# Patient Record
Sex: MALE | Race: WHITE | NOT HISPANIC OR LATINO | URBAN - METROPOLITAN AREA
[De-identification: names, ages, dates, MRNs, and addresses within clinical notes are randomized per-mention and may not be internally consistent; named-entity substitution may affect disease eponyms.]

---

## 2019-08-29 PROBLEM — Z00.00 ENCOUNTER FOR PREVENTIVE HEALTH EXAMINATION: Status: ACTIVE | Noted: 2019-08-29

## 2022-09-09 NOTE — H&P PST ADULT - ASSESSMENT
67 year old male with h/o HTN, HLD, s/p CVA 7/14/22 thought to be secondary to intracardiac thrombus now on coumadin who c/o TINAJERO.  PET showed decrease in EF to 37% and abnormal myocardial perfusion imaging.  For [facility] to assess coronary arteries.      Impression/Plan: 66yo male with evidence of likely recent MI c/b LVEF drop to 40-45% on TTE and LV thrombus with subsequent CVA 7/14/22, started on coumadin and PET/CT with large reversible defect ante/antelateral/septal wall, now presenting for LHC.    -plan for LHC via RA vs FA  -patient seen and examined  -confirmed appropriate NPO duration  -ECG and Labs reviewed  -Aspirin 81mg po pre-cath  -NS 50mL/hr X 4hr  -procedure discussed with patient; risks and benefits explained, questions answered  -consent obtained by attending IC

## 2022-09-09 NOTE — H&P PST ADULT - HISTORY OF PRESENT ILLNESS
Department of Cardiology                                                                  [facility]/[place] [address] [place] [address]                                                            Telephone: [phone]. Fax:[phone]                                                                             Pre- Cardiac Procedure H + P      HPI:  67 year old male with h/o HTN, HLD, s/p CVA 7/14/22 thought to be secondary to intracardiac thrombus now on coumadin who c/o TINAJERO.  PET showed decrease in EF to 37% and abnormal myocardial perfusion imaging.  For [facility] to assess coronary arteries.      Symptoms:        Angina (Class):        Ischemic Symptoms:     Heart Failure:        Systolic/Diastolic/Combined:        NYHA Class (within 2 weeks):     Assessment of LVEF:       EF: 40-45%       Assessed by: echo       Date: 6/10/22    Prior Cardiac Interventions: None         Noninvasive Testing:   Stress Test: Date: 8/23/22       Protocol: Cardiac PET       Symptoms: none       EKG Changes: none       Myocardial Imaging: large sized reversible defect in the anterior, anterolateral and septal areas.  Coronary flow reserved reduced       Risk Assessment (Low, Medium, High): medium    Echo:   mild AS, mild AR, mild to mod MR    Risk Assessments:  ASA:  Mallampati:  Bleeding Risk:  Creatinine:  GFR:    Associated Risk Factors:        Frailty Screening: (N/A, mild, mod, severe)       Cerebrovascular Disease: N/A       Chronic Lung Disease: N/A       Peripheral Arterial Disease: N/A       Chronic Kidney Disease (if yes, what is GFR): N/A       Uncontrolled Diabetes (if yes, what is HgbA1C or FBS): N/A       Poorly Controlled Hypertension (if yes, what is SBP): N/A       Morbid Obesity (if yes, what is BMI): N/A       History of Recent Ventricular Arrhythmia: N/A       Inability to Ambulate Safely: N/A       Need for Therapeutic Anticoagulation: y       Antiplatelet or Contrast Allergy: N/A    Antianginal Therapies:        Beta Blockers:         Calcium Channel Blockers:        Long Acting Nitrates:        Ranexa:     	  MEDICATIONS:                    ROS:     PHYSICAL EXAM:      T(C): --  HR: --  BP: --  RR: --  SpO2: --  Wt(kg): --      I&O's Summary      Daily     Daily     TELEMETRY: 	      ECG:  	    LABS:	 	                        Tnl:    Lipid Profile:   TC  TG  LDL  HDL    HgA1c:     proBNP:     TSH:     Impression/Plan:      ****-IVH with NS 250mL bolus pre-cath                                                                             Department of Cardiology                                                                  [facility]/[place] [address] [facility]                                                            Telephone: [phone]. Fax:[phone]                                                                             Pre- Cardiac Procedure H + P      HPI:  67 year old male with h/o HTN, HLD, recently seen by PCP and noted to have ECG abnormality, subsequently referred to Dr Mirza and underwent TTE 6/10/22 and noted to have drop in LVEF to 40-45%, plan to undergo further w/u but subsequently admitted with CVA 7/14/22 possible sequale of likely recent MI c/b LV thrombus, PET/CT with large reversible defect anteried decrease in EF to 37% and abnormal myocardial perfusion imaging.  For [facility] to assess coronary arteries.      Symptoms:        Angina (Class):        Ischemic Symptoms:     Heart Failure:        Systolic/Diastolic/Combined:        NYHA Class (within 2 weeks):     Assessment of LVEF:       EF: 40-45%       Assessed by: echo       Date: 6/10/22    Prior Cardiac Interventions: None         Noninvasive Testing:   Stress Test: Date: 8/23/22       Protocol: Cardiac PET       Symptoms: none       EKG Changes: none       Myocardial Imaging: large sized reversible defect in the anterior, anterolateral and septal areas.  Coronary flow reserved reduced       Risk Assessment (Low, Medium, High): medium    Echo:   mild AS, mild AR, mild to mod MR    Risk Assessments:  ASA:  Mallampati:  Bleeding Risk:  Creatinine:  GFR:    Associated Risk Factors:        Frailty Screening: (N/A, mild, mod, severe)       Cerebrovascular Disease: N/A       Chronic Lung Disease: N/A       Peripheral Arterial Disease: N/A       Chronic Kidney Disease (if yes, what is GFR): N/A       Uncontrolled Diabetes (if yes, what is HgbA1C or FBS): N/A       Poorly Controlled Hypertension (if yes, what is SBP): N/A       Morbid Obesity (if yes, what is BMI): N/A       History of Recent Ventricular Arrhythmia: N/A       Inability to Ambulate Safely: N/A       Need for Therapeutic Anticoagulation: y       Antiplatelet or Contrast Allergy: N/A    Antianginal Therapies:        Beta Blockers:         Calcium Channel Blockers:        Long Acting Nitrates:        Ranexa:     	  MEDICATIONS:                    ROS:     PHYSICAL EXAM:      T(C): --  HR: --  BP: --  RR: --  SpO2: --  Wt(kg): --      I&O's Summary      Daily     Daily     TELEMETRY: 	      ECG:  	    LABS:	 	                        Tnl:    Lipid Profile:   TC  TG  LDL  HDL    HgA1c:     proBNP:     TSH:     Impression/Plan:      ****-IVH with NS 250mL bolus pre-cath                                                                             Department of Cardiology                                                                  [facility]/[place] [address]                                                            Telephone: [phone]. Fax:[phone]                                                                             Pre- Cardiac Procedure H + P      HPI:  67 year old male with h/o HTN, HLD, recently seen by PCP and noted to have ECG with ante QW, subsequently referred to cardiologist Dr Mirza and underwent TTE 6/10/22 and noted to have drop in LVEF to 40-45%, plan to undergo further w/u but subsequently admitted with CVA 7/14/22, possible sequale of likely recent MI c/b LV thrombus, started on coumadin, residual left sided weakness but improved, mild speech impairment and left eye vision loss, does report intermittent episodes for chest pain during stressful events, PET/CT with large reversible defect anterior/antelat/septal wall, LVEF rest 37%, stress 36%, now presents for LHC to be performed by Dr Lisa.       Symptoms:        Angina (Class): III       Ischemic Symptoms: CP    Heart Failure:        Systolic/Diastolic/Combined: LVEF 40-45%       NYHA Class (within 2 weeks): II    Assessment of LVEF:       EF: 40-45%       Assessed by: echo       Date: 6/10/22    Prior Cardiac Interventions: None         PET/CT 8/23/22:       Protocol: Cardiac PET       Symptoms: none       EKG Changes: none       Myocardial Imaging: large sized reversible defect in the anterior, anterolateral and septal areas.  Coronary flow reserved reduced       Risk Assessment (Low, Medium, High): medium    Echo 6/10/22:   mild AS, mild AR, mild to mod MR, EF 40-45%    Risk Assessments:  ASA: 3  Mallampati: 2  Bleeding Risk: 1.3%  Creatinine: 0.8  GFR: 94    Associated Risk Factors:        Frailty Screening: (N/A, mild, mod, severe): mod       Cerebrovascular Disease: N/A       Chronic Lung Disease: N/A       Peripheral Arterial Disease: N/A       Chronic Kidney Disease (if yes, what is GFR): N/A       Uncontrolled Diabetes (if yes, what is HgbA1C or FBS): N/A       Poorly Controlled Hypertension (if yes, what is SBP): N/A       Morbid Obesity (if yes, what is BMI): N/A       History of Recent Ventricular Arrhythmia: N/A       Inability to Ambulate Safely: N/A       Need for Therapeutic Anticoagulation: y       Antiplatelet or Contrast Allergy: N/A    Antianginal Therapies:        Beta Blockers:  Toprol       Calcium Channel Blockers:        Long Acting Nitrates:        Ranexa:     ROS: as stated above, otherwise negative    PHYSICAL EXAM:  Constitutional: A & O x 3, NAD  HEENT:  Normal oral mucosa, PERRL, EOMI	  Cardiovascular: S1 S2, II/VI systolic murmur, No JVD  Respiratory: Lungs clear to auscultation	  Gastrointestinal:  Soft, Non-tender, + BS	  Skin: No rashes or cyanosis  Neurologic: No deficit appreciated  Extremities: Normal range of motion, no edema  Vascular: distal pulses +       ECG:  SR 84BPM with ante QW    LABS:	 	    09-12    139  |  103  |  20.1<H>  ----------------------------<  106<H>  3.9   |  26.0  |  0.85    Ca    10.0      12 Sep 2022 08:40  Mg     2.1     09-12                          15.8   7.03  )-----------( 216      ( 12 Sep 2022 08:40 )             47.2

## 2022-09-09 NOTE — H&P PST ADULT - NSICDXPASTMEDICALHX_GEN_ALL_CORE_FT
PAST MEDICAL HISTORY:  Abnormal nuclear stress test     Cardiomyopathy     CVA (cerebrovascular accident)     Hyperlipidemia     Hypertension     Intracardiac thrombus

## 2022-09-12 ENCOUNTER — INPATIENT (INPATIENT)
Facility: HOSPITAL | Age: 67
LOS: 6 days | Discharge: HOME HEALTH | DRG: 217 | End: 2022-09-19
Attending: THORACIC SURGERY (CARDIOTHORACIC VASCULAR SURGERY) | Admitting: THORACIC SURGERY (CARDIOTHORACIC VASCULAR SURGERY)
Payer: MEDICARE

## 2022-09-12 VITALS
RESPIRATION RATE: 16 BRPM | OXYGEN SATURATION: 99 % | HEART RATE: 83 BPM | DIASTOLIC BLOOD PRESSURE: 57 MMHG | SYSTOLIC BLOOD PRESSURE: 122 MMHG | TEMPERATURE: 98 F

## 2022-09-12 DIAGNOSIS — E78.5 HYPERLIPIDEMIA, UNSPECIFIED: ICD-10-CM

## 2022-09-12 DIAGNOSIS — I50.20 UNSPECIFIED SYSTOLIC (CONGESTIVE) HEART FAILURE: ICD-10-CM

## 2022-09-12 DIAGNOSIS — R94.39 ABNORMAL RESULT OF OTHER CARDIOVASCULAR FUNCTION STUDY: ICD-10-CM

## 2022-09-12 DIAGNOSIS — I51.3 INTRACARDIAC THROMBOSIS, NOT ELSEWHERE CLASSIFIED: ICD-10-CM

## 2022-09-12 DIAGNOSIS — I63.9 CEREBRAL INFARCTION, UNSPECIFIED: ICD-10-CM

## 2022-09-12 DIAGNOSIS — I25.10 ATHEROSCLEROTIC HEART DISEASE OF NATIVE CORONARY ARTERY WITHOUT ANGINA PECTORIS: ICD-10-CM

## 2022-09-12 DIAGNOSIS — I10 ESSENTIAL (PRIMARY) HYPERTENSION: ICD-10-CM

## 2022-09-12 LAB
ANION GAP SERPL CALC-SCNC: 10 MMOL/L — SIGNIFICANT CHANGE UP (ref 5–17)
APPEARANCE UR: CLEAR — SIGNIFICANT CHANGE UP
APTT BLD: 31.3 SEC — SIGNIFICANT CHANGE UP (ref 27.5–35.5)
APTT BLD: 35.8 SEC — HIGH (ref 27.5–35.5)
BILIRUB UR-MCNC: NEGATIVE — SIGNIFICANT CHANGE UP
BUN SERPL-MCNC: 20.1 MG/DL — HIGH (ref 8–20)
CALCIUM SERPL-MCNC: 10 MG/DL — SIGNIFICANT CHANGE UP (ref 8.4–10.5)
CHLORIDE SERPL-SCNC: 103 MMOL/L — SIGNIFICANT CHANGE UP (ref 98–107)
CO2 SERPL-SCNC: 26 MMOL/L — SIGNIFICANT CHANGE UP (ref 22–29)
COLOR SPEC: YELLOW — SIGNIFICANT CHANGE UP
CREAT SERPL-MCNC: 0.85 MG/DL — SIGNIFICANT CHANGE UP (ref 0.5–1.3)
DIFF PNL FLD: NEGATIVE — SIGNIFICANT CHANGE UP
EGFR: 94 ML/MIN/1.73M2 — SIGNIFICANT CHANGE UP
GLUCOSE SERPL-MCNC: 106 MG/DL — HIGH (ref 70–99)
GLUCOSE UR QL: NEGATIVE MG/DL — SIGNIFICANT CHANGE UP
HCT VFR BLD CALC: 47.2 % — SIGNIFICANT CHANGE UP (ref 39–50)
HGB BLD-MCNC: 15.8 G/DL — SIGNIFICANT CHANGE UP (ref 13–17)
INR BLD: 1.33 RATIO — HIGH (ref 0.88–1.16)
KETONES UR-MCNC: ABNORMAL
LEUKOCYTE ESTERASE UR-ACNC: NEGATIVE — SIGNIFICANT CHANGE UP
MAGNESIUM SERPL-MCNC: 2.1 MG/DL — SIGNIFICANT CHANGE UP (ref 1.6–2.6)
MCHC RBC-ENTMCNC: 28.6 PG — SIGNIFICANT CHANGE UP (ref 27–34)
MCHC RBC-ENTMCNC: 33.5 GM/DL — SIGNIFICANT CHANGE UP (ref 32–36)
MCV RBC AUTO: 85.4 FL — SIGNIFICANT CHANGE UP (ref 80–100)
NITRITE UR-MCNC: NEGATIVE — SIGNIFICANT CHANGE UP
PH UR: 6 — SIGNIFICANT CHANGE UP (ref 5–8)
PLATELET # BLD AUTO: 216 K/UL — SIGNIFICANT CHANGE UP (ref 150–400)
POTASSIUM SERPL-MCNC: 3.9 MMOL/L — SIGNIFICANT CHANGE UP (ref 3.5–5.3)
POTASSIUM SERPL-SCNC: 3.9 MMOL/L — SIGNIFICANT CHANGE UP (ref 3.5–5.3)
PROT UR-MCNC: NEGATIVE — SIGNIFICANT CHANGE UP
PROTHROM AB SERPL-ACNC: 15.5 SEC — HIGH (ref 10.5–13.4)
RBC # BLD: 5.53 M/UL — SIGNIFICANT CHANGE UP (ref 4.2–5.8)
RBC # FLD: 14.1 % — SIGNIFICANT CHANGE UP (ref 10.3–14.5)
SODIUM SERPL-SCNC: 139 MMOL/L — SIGNIFICANT CHANGE UP (ref 135–145)
SP GR SPEC: 1.02 — SIGNIFICANT CHANGE UP (ref 1.01–1.02)
UROBILINOGEN FLD QL: NEGATIVE MG/DL — SIGNIFICANT CHANGE UP
WBC # BLD: 7.03 K/UL — SIGNIFICANT CHANGE UP (ref 3.8–10.5)
WBC # FLD AUTO: 7.03 K/UL — SIGNIFICANT CHANGE UP (ref 3.8–10.5)

## 2022-09-12 PROCEDURE — 93880 EXTRACRANIAL BILAT STUDY: CPT | Mod: 26

## 2022-09-12 PROCEDURE — 99152 MOD SED SAME PHYS/QHP 5/>YRS: CPT

## 2022-09-12 PROCEDURE — 70450 CT HEAD/BRAIN W/O DYE: CPT | Mod: 26

## 2022-09-12 PROCEDURE — 99221 1ST HOSP IP/OBS SF/LOW 40: CPT

## 2022-09-12 PROCEDURE — 99223 1ST HOSP IP/OBS HIGH 75: CPT

## 2022-09-12 PROCEDURE — 93454 CORONARY ARTERY ANGIO S&I: CPT | Mod: 26

## 2022-09-12 PROCEDURE — 93306 TTE W/DOPPLER COMPLETE: CPT | Mod: 26

## 2022-09-12 RX ORDER — METOPROLOL TARTRATE 50 MG
25 TABLET ORAL DAILY
Refills: 0 | Status: DISCONTINUED | OUTPATIENT
Start: 2022-09-12 | End: 2022-09-14

## 2022-09-12 RX ORDER — ROSUVASTATIN CALCIUM 5 MG/1
1 TABLET ORAL
Qty: 0 | Refills: 0 | DISCHARGE

## 2022-09-12 RX ORDER — SODIUM CHLORIDE 9 MG/ML
3 INJECTION INTRAMUSCULAR; INTRAVENOUS; SUBCUTANEOUS EVERY 8 HOURS
Refills: 0 | Status: DISCONTINUED | OUTPATIENT
Start: 2022-09-12 | End: 2022-09-14

## 2022-09-12 RX ORDER — HEPARIN SODIUM 5000 [USP'U]/ML
INJECTION INTRAVENOUS; SUBCUTANEOUS
Qty: 25000 | Refills: 0 | Status: DISCONTINUED | OUTPATIENT
Start: 2022-09-12 | End: 2022-09-12

## 2022-09-12 RX ORDER — ATORVASTATIN CALCIUM 80 MG/1
80 TABLET, FILM COATED ORAL AT BEDTIME
Refills: 0 | Status: DISCONTINUED | OUTPATIENT
Start: 2022-09-12 | End: 2022-09-14

## 2022-09-12 RX ORDER — SPIRONOLACTONE 25 MG/1
12.5 TABLET, FILM COATED ORAL DAILY
Refills: 0 | Status: DISCONTINUED | OUTPATIENT
Start: 2022-09-13 | End: 2022-09-14

## 2022-09-12 RX ORDER — ASPIRIN/CALCIUM CARB/MAGNESIUM 324 MG
1 TABLET ORAL
Qty: 0 | Refills: 0 | DISCHARGE

## 2022-09-12 RX ORDER — HEPARIN SODIUM 5000 [USP'U]/ML
3500 INJECTION INTRAVENOUS; SUBCUTANEOUS EVERY 6 HOURS
Refills: 0 | Status: DISCONTINUED | OUTPATIENT
Start: 2022-09-12 | End: 2022-09-14

## 2022-09-12 RX ORDER — SODIUM CHLORIDE 9 MG/ML
200 INJECTION INTRAMUSCULAR; INTRAVENOUS; SUBCUTANEOUS
Refills: 0 | Status: DISCONTINUED | OUTPATIENT
Start: 2022-09-12 | End: 2022-09-12

## 2022-09-12 RX ORDER — POTASSIUM CHLORIDE 20 MEQ
20 PACKET (EA) ORAL ONCE
Refills: 0 | Status: DISCONTINUED | OUTPATIENT
Start: 2022-09-12 | End: 2022-09-12

## 2022-09-12 RX ORDER — HEPARIN SODIUM 5000 [USP'U]/ML
7500 INJECTION INTRAVENOUS; SUBCUTANEOUS EVERY 6 HOURS
Refills: 0 | Status: DISCONTINUED | OUTPATIENT
Start: 2022-09-12 | End: 2022-09-14

## 2022-09-12 RX ORDER — SODIUM CHLORIDE 9 MG/ML
200 INJECTION INTRAMUSCULAR; INTRAVENOUS; SUBCUTANEOUS
Refills: 0 | Status: COMPLETED | OUTPATIENT
Start: 2022-09-12 | End: 2022-09-12

## 2022-09-12 RX ORDER — POTASSIUM CHLORIDE 20 MEQ
20 PACKET (EA) ORAL ONCE
Refills: 0 | Status: COMPLETED | OUTPATIENT
Start: 2022-09-12 | End: 2022-09-12

## 2022-09-12 RX ORDER — SACUBITRIL AND VALSARTAN 24; 26 MG/1; MG/1
1 TABLET, FILM COATED ORAL
Refills: 0 | Status: DISCONTINUED | OUTPATIENT
Start: 2022-09-12 | End: 2022-09-13

## 2022-09-12 RX ORDER — HEPARIN SODIUM 5000 [USP'U]/ML
INJECTION INTRAVENOUS; SUBCUTANEOUS
Qty: 25000 | Refills: 0 | Status: DISCONTINUED | OUTPATIENT
Start: 2022-09-12 | End: 2022-09-14

## 2022-09-12 RX ORDER — ASPIRIN/CALCIUM CARB/MAGNESIUM 324 MG
81 TABLET ORAL DAILY
Refills: 0 | Status: DISCONTINUED | OUTPATIENT
Start: 2022-09-12 | End: 2022-09-14

## 2022-09-12 RX ADMIN — ATORVASTATIN CALCIUM 80 MILLIGRAM(S): 80 TABLET, FILM COATED ORAL at 21:34

## 2022-09-12 RX ADMIN — Medication 20 MILLIEQUIVALENT(S): at 11:05

## 2022-09-12 RX ADMIN — SACUBITRIL AND VALSARTAN 1 TABLET(S): 24; 26 TABLET, FILM COATED ORAL at 17:44

## 2022-09-12 RX ADMIN — SODIUM CHLORIDE 3 MILLILITER(S): 9 INJECTION INTRAMUSCULAR; INTRAVENOUS; SUBCUTANEOUS at 21:04

## 2022-09-12 RX ADMIN — SODIUM CHLORIDE 50 MILLILITER(S): 9 INJECTION INTRAMUSCULAR; INTRAVENOUS; SUBCUTANEOUS at 11:04

## 2022-09-12 RX ADMIN — SODIUM CHLORIDE 3 MILLILITER(S): 9 INJECTION INTRAMUSCULAR; INTRAVENOUS; SUBCUTANEOUS at 14:39

## 2022-09-12 RX ADMIN — HEPARIN SODIUM 1700 UNIT(S)/HR: 5000 INJECTION INTRAVENOUS; SUBCUTANEOUS at 21:35

## 2022-09-12 NOTE — CONSULT NOTE ADULT - PROBLEM SELECTOR RECOMMENDATION 3
LV thrombus found in July s/p CVA, thought to be due to MI  on coumadin at home, held for cath and was bridged with lovenox  heparin gtt started by cardiology, cont with FULL AC nomogram

## 2022-09-12 NOTE — CONSULT NOTE ADULT - PROBLEM SELECTOR RECOMMENDATION 6
EF 40-45 on prior outpt echo per cardiology  repeat TTE pending  cont Entresto for now, will need to hold when plan/timing for cabg determined

## 2022-09-12 NOTE — CONSULT NOTE ADULT - SUBJECTIVE AND OBJECTIVE BOX
Surgeon: Wilder    Consult requesting by: Maria L    HISTORY OF PRESENT ILLNESS:  67M, pmhx HTN, HLD, recent MI, CVA 7/14/22 ( ______ weakness)  found to have LV thrombus, now s/p elective cath with multivessel dz.     HPI:  67 year old male with h/o HTN, HLD, recently seen by PCP and noted to have ECG with ante QW, subsequently referred to cardiologist Dr Mirza and underwent TTE 6/10/22 and noted to have drop in LVEF to 40-45%, plan to undergo further w/u but subsequently admitted with CVA 7/14/22, possible sequale of likely recent MI c/b LV thrombus, started on coumadin, residual left sided weakness but improved, mild speech impairment and left eye vision loss, does report intermittent episodes for chest pain during stressful events, PET/CT with large reversible defect anterior/antelat/septal wall, LVEF rest 37%, stress 36%, now presents for LHC to be performed by Dr Lisa.     PAST MEDICAL & SURGICAL HISTORY:  Hypertension  Hyperlipidemia  CVA (cerebrovascular accident)  Intracardiac thrombus  Abnormal nuclear stress test  Cardiomyopathy  No significant past surgical history    MEDICATIONS  (STANDING):  aspirin  chewable 81 milliGRAM(s) Oral daily  atorvastatin 80 milliGRAM(s) Oral at bedtime  heparin  Infusion.  Unit(s)/Hr (17 mL/Hr) IV Continuous <Continuous>  metoprolol succinate ER 25 milliGRAM(s) Oral daily  sacubitril 24 mG/valsartan 26 mG 1 Tablet(s) Oral two times a day  sodium chloride 0.9% lock flush 3 milliLiter(s) IV Push every 8 hours    MEDICATIONS  (PRN):  heparin   Injectable 7500 Unit(s) IV Push every 6 hours PRN For aPTT less than 40  heparin   Injectable 3500 Unit(s) IV Push every 6 hours PRN For aPTT between 40 - 57    Allergies: No Known Allergies    SOCIAL HISTORY:      FAMILY HISTORY:      Review of Systems  negative x 10 systems except as noted above    PHYSICAL EXAM  Vital Signs Last 24 Hrs  T(C): 36.7 (12 Sep 2022 09:25), Max: 36.7 (12 Sep 2022 09:25)  T(F): 98 (12 Sep 2022 09:25), Max: 98 (12 Sep 2022 09:25)  HR: 70 (12 Sep 2022 12:20) (68 - 83)  BP: 112/71 (12 Sep 2022 12:20) (111/69 - 122/57)  RR: 16 (12 Sep 2022 12:20) (16 - 16)  SpO2: 98% (12 Sep 2022 12:20) (97% - 99%)    Parameters below as of 12 Sep 2022 12:20  Patient On (Oxygen Delivery Method): room air    Gen:  Neuro:  HEENT:  Neck:  CV:  Pulm:  Abd:  Ext:  vascular:  skin:  psych:    LABS:                        15.8   7.03  )-----------( 216      ( 12 Sep 2022 08:40 )             47.2     09-12    139  |  103  |  20.1<H>  ----------------------------<  106<H>  3.9   |  26.0  |  0.85    Ca    10.0      12 Sep 2022 08:40  Mg     2.1     09-12    PT/INR - ( 12 Sep 2022 09:30 )   PT: 15.5 sec;   INR: 1.33 ratio      PTT - ( 12 Sep 2022 09:30 )  PTT:35.8 sec    Cardiac Cath:  official report pending  verbal from Dr. Lisa  dLM 80, prox-mid LAD 90-95, Cx 80-90, RCA 50    TTE / SIMA: pending                            Surgeon: Wilder    Consult requesting by: Maria L Riley cards: Haileycharlessita     HISTORY OF PRESENT ILLNESS:  67M, pmhx HTN, HLD, recent MI, CVA 7/14/22 ( left hand weakness, left eye blindness)  found to have LV thrombus, now s/p elective cath with multivessel dz. Pt originally found to have Q waves on EKG on routine PMD visit referred to cardiology where he had a TTE that showed EF 40-45%. Pt was recommended further eval but had a stroke on 7/14/2022 where he spent 20 days in the hospital per the wife.  During that hospitalization, pt was found to have LV thrombus,  thought to be secondary to a MI and the cause of his CVA. Ultimately discharged home on coumadin. Pt has since undergone a PET CT with large reversible defect in anterior/anterolateral/septal wall with EF 37% and was referred for cardiac cath. Cath with LM, LAD, Cx, and RCA dz.  Cardiology notes pt reporting with intermittent CP. Pt denies, wife at bedside and states she did notice pt was more fatigued leading up to his stroke. Pt denies current CP, palpitations, SOB, cough, fever, chills, itchiness/rash, diaphoresis, vision changes, HA, dizziness/lightheadedness, numbness/tingling, abd pain, N/V.     PAST MEDICAL & SURGICAL HISTORY:  Hypertension  Hyperlipidemia  CVA (cerebrovascular accident)  Intracardiac thrombus  Abnormal nuclear stress test  Cardiomyopathy  No significant past surgical history    MEDICATIONS  (STANDING):  aspirin  chewable 81 milliGRAM(s) Oral daily  atorvastatin 80 milliGRAM(s) Oral at bedtime  heparin  Infusion.  Unit(s)/Hr (17 mL/Hr) IV Continuous <Continuous>  metoprolol succinate ER 25 milliGRAM(s) Oral daily  sacubitril 24 mG/valsartan 26 mG 1 Tablet(s) Oral two times a day  sodium chloride 0.9% lock flush 3 milliLiter(s) IV Push every 8 hours    MEDICATIONS  (PRN):  heparin   Injectable 7500 Unit(s) IV Push every 6 hours PRN For aPTT less than 40  heparin   Injectable 3500 Unit(s) IV Push every 6 hours PRN For aPTT between 40 - 57    Allergies: No Known Allergies    SOCIAL HISTORY:      FAMILY HISTORY:      Review of Systems  negative x 10 systems except as noted above    PHYSICAL EXAM  Vital Signs Last 24 Hrs  T(C): 36.7 (12 Sep 2022 09:25), Max: 36.7 (12 Sep 2022 09:25)  T(F): 98 (12 Sep 2022 09:25), Max: 98 (12 Sep 2022 09:25)  HR: 70 (12 Sep 2022 12:20) (68 - 83)  BP: 112/71 (12 Sep 2022 12:20) (111/69 - 122/57)  RR: 16 (12 Sep 2022 12:20) (16 - 16)  SpO2: 98% (12 Sep 2022 12:20) (97% - 99%)    Parameters below as of 12 Sep 2022 12:20  Patient On (Oxygen Delivery Method): room air    Gen:  Neuro:  HEENT:  Neck:  CV:  Pulm:  Abd:  Ext:  vascular:  skin:  psych:    LABS:                        15.8   7.03  )-----------( 216      ( 12 Sep 2022 08:40 )             47.2     09-12    139  |  103  |  20.1<H>  ----------------------------<  106<H>  3.9   |  26.0  |  0.85    Ca    10.0      12 Sep 2022 08:40  Mg     2.1     09-12    PT/INR - ( 12 Sep 2022 09:30 )   PT: 15.5 sec;   INR: 1.33 ratio      PTT - ( 12 Sep 2022 09:30 )  PTT:35.8 sec    Cardiac Cath:  official report pending  verbal from Dr. Lisa  dLM 80, prox-mid LAD 90-95, Cx 80-90, RCA 50    TTE / SIMA: pending                            Surgeon: Wilder    Consult requesting by: Maria L Riley cards: Haileycharlessita     HISTORY OF PRESENT ILLNESS:  67M, pmhx HTN, HLD, recent MI, CVA 7/14/22 ( left hand weakness, left eye blindness)  found to have LV thrombus, now s/p elective cath with multivessel dz. Pt originally found to have Q waves on EKG on routine PMD visit referred to cardiology where he had a TTE that showed EF 40-45%. Pt was recommended further eval but had a stroke on 7/14/2022 where he spent 20 days in the hospital per the wife.  During that hospitalization, pt was found to have LV thrombus,  thought to be secondary to a MI and the cause of his CVA. Ultimately discharged home on coumadin. Pt has since undergone a PET CT with large reversible defect in anterior/anterolateral/septal wall with EF 37% and was referred for cardiac cath. Cath with LM, LAD, Cx, and RCA dz.  Cardiology notes pt reporting with intermittent CP. Pt denies, wife at bedside and states she did notice pt was more fatigued leading up to his stroke. Pt denies current CP, palpitations, SOB, cough, fever, chills, itchiness/rash, diaphoresis, vision changes, HA, dizziness/lightheadedness, numbness/tingling, abd pain, N/V.     PAST MEDICAL & SURGICAL HISTORY:  Hypertension  Hyperlipidemia  CVA (cerebrovascular accident)  Intracardiac thrombus  Abnormal nuclear stress test  Cardiomyopathy  No significant past surgical history    MEDICATIONS  (STANDING):  aspirin  chewable 81 milliGRAM(s) Oral daily  atorvastatin 80 milliGRAM(s) Oral at bedtime  heparin  Infusion.  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[employer]  currently not working but independent, no use of assist devices     FAMILY HISTORY:  "heart problems" in mother/father    Review of Systems  negative x 10 systems except as noted above    PHYSICAL EXAM  Vital Signs Last 24 Hrs  T(C): 36.7 (12 Sep 2022 09:25), Max: 36.7 (12 Sep 2022 09:25)  T(F): 98 (12 Sep 2022 09:25), Max: 98 (12 Sep 2022 09:25)  HR: 70 (12 Sep 2022 12:20) (68 - 83)  BP: 112/71 (12 Sep 2022 12:20) (111/69 - 122/57)  RR: 16 (12 Sep 2022 12:20) (16 - 16)  SpO2: 98% (12 Sep 2022 12:20) (97% - 99%)    Parameters below as of 12 Sep 2022 12:20  Patient On (Oxygen Delivery Method): room air    Gen: NAD  Neuro: A&Ox3 L hand weakness, b/l LE equal strength  HEENT:PERRL  Neck: supple, no JVD/carotid bruits  CV: S1s2 RRR no murmurs  Pulm: CTA b/l no wheezing  Abd: +BS soft NT ND  Ext: no edema/cyanosis GARCIA  vascular: 2+ DP and radial pulses b/l  skin: no rashes, warm dry perfused  psych: flat affect    LABS:                        15.8   7.03  )-----------( 216      ( 12 Sep 2022 08:40 )             47.2     09-12    139  |  103  |  20.1<H>  ----------------------------<  106<H>  3.9   |  26.0  |  0.85    Ca    10.0      12 Sep 2022 08:40  Mg     2.1     09-12    PT/INR - ( 12 Sep 2022 09:30 )   PT: 15.5 sec;   INR: 1.33 ratio      PTT - ( 12 Sep 2022 09:30 )  PTT:35.8 sec    Cardiac Cath:  official report pending  verbal from Dr. Lisa  dLM 80, prox-mid LAD 90-95, Cx 80-90, RCA 50    TTE / SIMA: pending                            Surgeon: Wilder    Consult requesting by: Maria L Riley cards: Haileycharlessita     HISTORY OF PRESENT ILLNESS:  67M, pmhx HTN, HLD, recent MI, CVA 7/14/22 ( left hand weakness, left eye blindness)  found to have LV thrombus, now s/p elective cath with multivessel dz. Pt originally found to have Q waves on EKG on routine PMD visit referred to cardiology where he had a TTE that showed EF 40-45%. Pt was recommended further eval but had a stroke on 7/14/2022 where he spent 20 days in the hospital per the wife.  During that hospitalization, pt was found to have LV thrombus,  thought to be secondary to a MI and the cause of his CVA. Ultimately discharged home on coumadin. Pt has since undergone a PET CT with large reversible defect in anterior/anterolateral/septal wall with EF 37% and was referred for cardiac cath. Cath with LM, LAD, Cx, and RCA dz.  Cardiology notes pt reporting with intermittent CP. Pt denies, wife at bedside and states she did notice pt was more fatigued leading up to his stroke. Pt denies current CP, palpitations, SOB, cough, fever, chills, itchiness/rash, diaphoresis, vision changes, HA, dizziness/lightheadedness, numbness/tingling, abd pain, N/V.     PAST MEDICAL & SURGICAL HISTORY:  Hypertension  Hyperlipidemia  CVA (cerebrovascular accident)  Intracardiac thrombus  Abnormal nuclear stress test  Cardiomyopathy  No significant past surgical history    MEDICATIONS  (STANDING):  aspirin  chewable 81 milliGRAM(s) Oral daily  atorvastatin 80 milliGRAM(s) Oral at bedtime  heparin  Infusion.  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 but independent, no use of assist devices     FAMILY HISTORY:  "heart problems" in mother/father  MTHFR gene    Review of Systems  negative x 10 systems except as noted above    PHYSICAL EXAM  Vital Signs Last 24 Hrs  T(C): 36.7 (12 Sep 2022 09:25), Max: 36.7 (12 Sep 2022 09:25)  T(F): 98 (12 Sep 2022 09:25), Max: 98 (12 Sep 2022 09:25)  HR: 70 (12 Sep 2022 12:20) (68 - 83)  BP: 112/71 (12 Sep 2022 12:20) (111/69 - 122/57)  RR: 16 (12 Sep 2022 12:20) (16 - 16)  SpO2: 98% (12 Sep 2022 12:20) (97% - 99%)    Parameters below as of 12 Sep 2022 12:20  Patient On (Oxygen Delivery Method): room air    Gen: NAD  Neuro: A&Ox3 L hand weakness, b/l LE equal strength  HEENT:PERRL  Neck: supple, no JVD/carotid bruits  CV: S1s2 RRR no murmurs  Pulm: CTA b/l no wheezing  Abd: +BS soft NT ND  Ext: no edema/cyanosis GARCIA  vascular: 2+ DP and radial pulses b/l  skin: no rashes, warm dry perfused  psych: flat affect    LABS:                        15.8   7.03  )-----------( 216      ( 12 Sep 2022 08:40 )             47.2     09-12    139  |  103  |  20.1<H>  ----------------------------<  106<H>  3.9   |  26.0  |  0.85    Ca    10.0      12 Sep 2022 08:40  Mg     2.1     09-12    PT/INR - ( 12 Sep 2022 09:30 )   PT: 15.5 sec;   INR: 1.33 ratio      PTT - ( 12 Sep 2022 09:30 )  PTT:35.8 sec    Cardiac Cath:  official report pending  verbal from Dr. Lisa  dLM 80, prox-mid LAD 90-95, Cx 80-90, RCA 50    TTE / SIMA: pending

## 2022-09-12 NOTE — CONSULT NOTE ADULT - SUBJECTIVE AND OBJECTIVE BOX
[facility]                                        Neurology at [place]                                  Mary Corado, & Cuco                                      [address]                                           [place], [place], [address]                                                [phone]        CC: Stroke    HISTORY:  The patient is a 67y Male with history of stroke in July of 2022 with mild left residual.   Work up revealed intracardiac thrombus and the patient has been on anticoagulation in addition to antiplatelet and statin.  Now presenting with cardiac issues.   Neurology called for clearance for CABG.     PAST MEDICAL & SURGICAL HISTORY:  Hypertension  Hyperlipidemia  CVA (cerebrovascular accident)  Intracardiac thrombus  Abnormal nuclear stress test  Cardiomyopathy  No significant past surgical history    MEDICATION PRIOR TO ADMISSION:  aspirin 81 mg oral tablet, chewable: 1 tab(s) orally once a day (12 Sep 2022 09:27)  Entresto 24 mg-26 mg oral tablet: 1 tab(s) orally 2 times a day (12 Sep 2022 09:27)  Lovenox 100 mg/mL injectable solution: injectable 2 times a day (12 Sep 2022 09:28)  metoprolol succinate 25 mg oral tablet, extended release: 1 tab(s) orally once a day (12 Sep 2022 09:27)  rosuvastatin 40 mg oral tablet: 1 tab(s) orally once a day (at bedtime) (12 Sep 2022 09:28)  spironolactone: 12.5 milligram(s) orally once a day (12 Sep 2022 09:28)  warfarin 7.5 mg oral tablet: 1 tab(s) orally once a day (at bedtime) (12 Sep 2022 09:28)    MEDICATIONS  (STANDING):  aspirin  chewable 81 milliGRAM(s) Oral daily  atorvastatin 80 milliGRAM(s) Oral at bedtime  heparin  Infusion.  Unit(s)/Hr (17 mL/Hr) IV Continuous <Continuous>  metoprolol succinate ER 25 milliGRAM(s) Oral daily  sacubitril 24 mG/valsartan 26 mG 1 Tablet(s) Oral two times a day  sodium chloride 0.9% lock flush 3 milliLiter(s) IV Push every 8 hours    MEDICATIONS  (PRN):  heparin   Injectable 7500 Unit(s) IV Push every 6 hours PRN For aPTT less than 40  heparin   Injectable 3500 Unit(s) IV Push every 6 hours PRN For aPTT between 40 - 57    Allergies  No Known Allergies    SOCIAL HISTORY:  Non smoker.     FAMILY HISTORY:  No known family history of stroke.     ROS:  Constitutional: The patient denies fevers or weight changes.  Neuro: As per HPI.  Eyes: Denies blurry vision.  Ears/nose/throat: Denies Tinnitus.   Cardiac: Denies chest pain. Denies palpitations.  Respiratory: Denies shortness of breath.  GI: Denies abdominal pain, nausea, or vomiting.  : Denies change in urinary pattern.  Integumentary: Denies rash.  Psych: Denies recent mood changes.  Heme: denies easy bleeding/bruising.    Exam:  Vital Signs Last 24 Hrs  T(C): 36.7 (12 Sep 2022 09:25), Max: 36.7 (12 Sep 2022 09:25)  T(F): 98 (12 Sep 2022 09:25), Max: 98 (12 Sep 2022 09:25)  HR: 73 (12 Sep 2022 13:50) (68 - 83)  BP: 121/81 (12 Sep 2022 13:50) (107/68 - 122/57)  BP(mean): --  RR: 16 (12 Sep 2022 13:50) (16 - 16)  SpO2: 99% (12 Sep 2022 13:50) (97% - 99%)    Parameters below as of 12 Sep 2022 13:50  Patient On (Oxygen Delivery Method): room air    General: NAD.   Carotid bruits absent.     Mental status: The patient is awake, alert, and fully oriented. There is no aphasia. Attention span is normal. Patient is aware of current events.     Cranial nerves: There is no papilledema. Pupils react symmetrically to light. There is mild left visual field deficit to confrontation. Extraocular motion is full with no nystagmus.  Facial sensation is intact. Facial musculature is symmetric. Palate elevates symmetrically. Tongue is midline.    Motor: There is normal bulk and tone.  Strength is 5/5 in the right arm and leg.   Strength is 5/5 in the left arm and leg. There is decreased fine finger movement on the left.    Sensation: Intact to light touch and pin. There is no extinction to double simultaneous stimulation.    Reflexes: 1+ throughout and plantar responses are flexor.    Cerebellar: There is no dysmetria on finger to nose testing.    NIH SS:   Date: 9/12/22  Time:   1a) Level of consciousness (0-3): 0  1b) Questions (0-2): 0  1c) Commands (0-2): 0  2  ) Gaze (0-2): 0  3  ) Visual field (0-3): 1  4  ) Facial palsy (0-3): 0  Motor  5a) Left arm (0-4): 1  5b) Right arm (0-4): 0  6a) Left leg (0-4): 0  6b) Right leg (0-4): 0  7  ) Ataxia (0-2): 0  Sensory  8  ) Sensory (0-2): 0  Speech  9  ) Language (0-3): 0  10) Dysarthria (0-2): 0  Extinction  11) Extinction/inattention (0-2): 0    Total score: 2    Prestroke Modified [place]: 0    (0: No symptoms and no disability.  1: No significant disability despite symptoms; able to carry out all usual duties and activities.  2: Slight disability; unable to carry out all previous activity but able to look after own affairs without assistance.  3: Moderate disability; requiring some help but able to walk without assistance.   4: Moderately severe disability; unable to walk without assistance and unable to attend to own bodily needs without assistance.  5: Severe disability; bedridden, incontinent and requiring constant nursing care and attention.   6: Dead. )     LABS:                         15.8   7.03  )-----------( 216      ( 12 Sep 2022 08:40 )             47.2       09-12    139  |  103  |  20.1<H>  ----------------------------<  106<H>  3.9   |  26.0  |  0.85    Ca    10.0      12 Sep 2022 08:40  Mg     2.1     09-12        PT/INR - ( 12 Sep 2022 09:30 )   PT: 15.5 sec;   INR: 1.33 ratio    PTT - ( 12 Sep 2022 09:30 )  PTT:35.8 sec    RADIOLOGY   ***         Yes

## 2022-09-12 NOTE — CONSULT NOTE ADULT - PROBLEM SELECTOR RECOMMENDATION 9
admit to Dr. Hdz  TTE with definity to assess LV thrombus, EF and for valvular dz  preop work up ordered (PFTs, cardotid US, CXR, labs etc)  cont heparin gtt for LV thrombus (on coumadin at home)  Dr. Hdz to review case and determine candidacy admit to Dr. Hdz  TTE with Definity to assess LV thrombus, EF and for valvular dz  preop work up ordered (PFTs, carotid US, CXR, labs etc)  cont heparin gtt for LV thrombus (on coumadin at home)  Dr. Hdz to review case and determine candidacy

## 2022-09-12 NOTE — PROGRESS NOTE ADULT - SUBJECTIVE AND OBJECTIVE BOX
Department of Cardiology                                                                  [facility]/[place] [address]                                                            Telephone: [phone]. Fax:[phone]                                                    Post- Procedure Note: Left Heart Cardiac Catheterization       Narrative:  67y  Male now s/p LHC via RRA with multivessel calcific CAD with dLMCA 80%, pLAD 90%, mLAD 95%, pLCx 80%, mRCA 40%, procedure performed by Dr. Lisa, received IA heparin, Fentanyl and Versed IV intraprocedurally, arrived to recovery in NAD and HDS, RRA access site stable, no bleed/hematoma, distal pulse +, plan for admission and CTS Dr Hdz to evaluate for possible revascularization with CABG, will start heparin full AC protocol at 1600 with no bolus, cont to hold coumadin.          PAST MEDICAL & SURGICAL HISTORY:  Hypertension  Hyperlipidemia  CVA (cerebrovascular accident)  Intracardiac thrombus  Abnormal nuclear stress test  Cardiomyopathy      No significant past surgical history        Home Medications:  aspirin 81 mg oral tablet, chewable: 1 tab(s) orally once a day (12 Sep 2022 09:27)  Entresto 24 mg-26 mg oral tablet: 1 tab(s) orally 2 times a day (12 Sep 2022 09:27)  Lovenox 100 mg/mL injectable solution: injectable 2 times a day (12 Sep 2022 09:28)  metoprolol succinate 25 mg oral tablet, extended release: 1 tab(s) orally once a day (12 Sep 2022 09:27)  rosuvastatin 40 mg oral tablet: 1 tab(s) orally once a day (at bedtime) (12 Sep 2022 09:28)  spironolactone: 12.5 milligram(s) orally once a day (12 Sep 2022 09:28)  warfarin 7.5 mg oral tablet: 1 tab(s) orally once a day (at bedtime) (12 Sep 2022 09:28)    No Known Allergies      Objective:  Vital Signs Last 24 Hrs  T(C): 36.7 (12 Sep 2022 09:25), Max: 36.7 (12 Sep 2022 09:25)  T(F): 98 (12 Sep 2022 09:25), Max: 98 (12 Sep 2022 09:25)  HR: 68 (12 Sep 2022 11:50) (68 - 83)  BP: 111/73 (12 Sep 2022 11:50) (111/69 - 122/57)  BP(mean): --  RR: 16 (12 Sep 2022 11:50) (16 - 16)  SpO2: 98% (12 Sep 2022 11:50) (98% - 99%)    Parameters below as of 12 Sep 2022 11:50  Patient On (Oxygen Delivery Method): room air          PHYSICAL EXAM:  Constitutional: A & O x 3, NAD  HEENT:  Normal oral mucosa, PERRL, EOMI	  Cardiovascular: S1 S2, No murmurs, No JVD  Respiratory: Lungs clear to auscultation	  Gastrointestinal:  Soft, Non-tender, + BS	  Skin: No rashes or cyanosis  Neurologic: No deficit appreciated  Extremities: Normal range of motion, no edema  Vascular: Access site stable, no bleed or hematoma, distal pulses +     Labs:                           15.8   7.03  )-----------( 216      ( 12 Sep 2022 08:40 )             47.2     09-12    139  |  103  |  20.1<H>  ----------------------------<  106<H>  3.9   |  26.0  |  0.85    Ca    10.0      12 Sep 2022 08:40  Mg     2.1     09-12      PT/INR - ( 12 Sep 2022 09:30 )   PT: 15.5 sec;   INR: 1.33 ratio         PTT - ( 12 Sep 2022 09:30 )  PTT:35.8 sec      Assessment: 67 year old male with h/o HTN, HLD, recently seen by PCP and noted to have ECG with ante QW, subsequently referred to cardiologist Dr Mirza and underwent TTE 6/10/22 and noted to have drop in LVEF to 40-45%, plan to undergo further w/u but subsequently admitted with CVA 7/14/22, possible sequale of likely recent MI c/b LV thrombus, started on coumadin, residual left sided weakness but improved, mild speech impairment and left eye vision loss, does report intermittent episodes for chest pain during stressful events, PET/CT with large reversible defect anterior/antelat/septal wall, LVEF rest 37%, stress 36%, now presents for LHC to be performed by Dr Lisa.     Now s/p LHC via RRA with multivessel calcific CAD with dLMCA 80%, pLAD 90%, mLAD 95%, pLCx 80%, mRCA 40%, procedure performed by Dr. Lisa, received IA heparin, Fentanyl and Versed IV intraprocedurally, arrived to recovery in NAD and HDS, RRA access site stable, no bleed/hematoma, distal pulse +, plan for admission and CTS Dr Hdz to evaluate for possible revascularization with CABG, will start heparin full AC protocol at 1600 with no bolus, cont to hold coumadin.        Plan:  -Formal cath report pending  -Post procedure management/monitoring per protocol  -Access site precautions  -Radial compression band removal at 1300  -Bedrest x 2hours post procedure  -Routine Labs in am   -Repeat ECG if any clinical indication or change on tele  -NS 50mL/hr X 4hr in progress  -Continue current medical therapy  -Cont ASA 81mg po daily  -Cont Toprol 25mg po daily  -Cont Entresto and Aldactone for HF GDMT  -Cont high dose statin with [id number] po qHS  -Start heparin Full AC at 1600 if RRA access site stable  -Educated regarding post procedure management and care  -Discussed the importance of RF modification  -Cardiac rehab info provided/referral and communication to cardiac rehab completed  -DISPO: in-pt admission for severe multivessel CAD and CTS eval for possible CABG                                                                              Department of Cardiology                                                                  [facility]/[place] [address] [place]                                                            Telephone: [phone]. Fax:[phone]                                                    Post- Procedure Note: Left Heart Cardiac Catheterization       Narrative:  67y  Male now s/p LHC via RRA with multivessel calcific CAD with dLMCA 80%, pLAD 90%, mLAD 95%, pLCx 80%, mRCA 40%, procedure performed by Dr. Lisa, received IA heparin, Fentanyl and Versed IV intraprocedurally, arrived to recovery in NAD and HDS, RRA access site stable, no bleed/hematoma, distal pulse +, plan for admission and CTS Dr Hdz to evaluate for possible revascularization with CABG, will start heparin full AC protocol at 1600 with no bolus, cont to hold coumadin.          PAST MEDICAL & SURGICAL HISTORY:  Hypertension  Hyperlipidemia  CVA (cerebrovascular accident)  Intracardiac thrombus  Abnormal nuclear stress test  Cardiomyopathy      No significant past surgical history        Home Medications:  aspirin 81 mg oral tablet, chewable: 1 tab(s) orally once a day (12 Sep 2022 09:27)  Entresto 24 mg-26 mg oral tablet: 1 tab(s) orally 2 times a day (12 Sep 2022 09:27)  Lovenox 100 mg/mL injectable solution: injectable 2 times a day (12 Sep 2022 09:28)  metoprolol succinate 25 mg oral tablet, extended release: 1 tab(s) orally once a day (12 Sep 2022 09:27)  rosuvastatin 40 mg oral tablet: 1 tab(s) orally once a day (at bedtime) (12 Sep 2022 09:28)  spironolactone: 12.5 milligram(s) orally once a day (12 Sep 2022 09:28)  warfarin 7.5 mg oral tablet: 1 tab(s) orally once a day (at bedtime) (12 Sep 2022 09:28)    No Known Allergies      Objective:  Vital Signs Last 24 Hrs  T(C): 36.7 (12 Sep 2022 09:25), Max: 36.7 (12 Sep 2022 09:25)  T(F): 98 (12 Sep 2022 09:25), Max: 98 (12 Sep 2022 09:25)  HR: 68 (12 Sep 2022 11:50) (68 - 83)  BP: 111/73 (12 Sep 2022 11:50) (111/69 - 122/57)  BP(mean): --  RR: 16 (12 Sep 2022 11:50) (16 - 16)  SpO2: 98% (12 Sep 2022 11:50) (98% - 99%)    Parameters below as of 12 Sep 2022 11:50  Patient On (Oxygen Delivery Method): room air          PHYSICAL EXAM:  Constitutional: A & O x 3, NAD  HEENT:  Normal oral mucosa, PERRL, EOMI	  Cardiovascular: S1 S2, No murmurs, No JVD  Respiratory: Lungs clear to auscultation	  Gastrointestinal:  Soft, Non-tender, + BS	  Skin: No rashes or cyanosis  Neurologic: No deficit appreciated  Extremities: Normal range of motion, no edema  Vascular: Access site stable, no bleed or hematoma, distal pulses +     Labs:                           15.8   7.03  )-----------( 216      ( 12 Sep 2022 08:40 )             47.2     09-12    139  |  103  |  20.1<H>  ----------------------------<  106<H>  3.9   |  26.0  |  0.85    Ca    10.0      12 Sep 2022 08:40  Mg     2.1     09-12      PT/INR - ( 12 Sep 2022 09:30 )   PT: 15.5 sec;   INR: 1.33 ratio         PTT - ( 12 Sep 2022 09:30 )  PTT:35.8 sec      Assessment: 67 year old male with h/o HTN, HLD, recently seen by PCP and noted to have ECG with ante QW, subsequently referred to cardiologist Dr Mirza and underwent TTE 6/10/22 and noted to have drop in LVEF to 40-45%, plan to undergo further w/u but subsequently admitted with CVA 7/14/22, possible sequale of likely recent MI c/b LV thrombus, started on coumadin, residual left sided weakness but improved, mild speech impairment and left eye vision loss, does report intermittent episodes for chest pain during stressful events, PET/CT with large reversible defect anterior/antelat/septal wall, LVEF rest 37%, stress 36%, now presents for LHC to be performed by Dr Lisa.     Now s/p LHC via RRA with multivessel calcific CAD with dLMCA 80%, pLAD 90%, mLAD 95%, pLCx 80%, mRCA 40%, procedure performed by Dr. Lisa, received IA heparin, Fentanyl and Versed IV intraprocedurally, arrived to recovery in NAD and HDS, RRA access site stable, no bleed/hematoma, distal pulse +, plan for admission and CTS Dr Hdz to evaluate for possible revascularization with CABG, will start heparin full AC protocol at 1600 with no bolus, cont to hold coumadin.        Plan:  -Formal cath report pending  -Post procedure management/monitoring per protocol  -Access site precautions  -Radial compression band removal at 1300  -Bedrest x 2hours post procedure  -Routine Labs in am   -Repeat ECG if any clinical indication or change on tele  -NS 50mL/hr X 4hr in progress  -Continue current medical therapy  -Cont ASA 81mg po daily  -Cont Toprol 25mg po daily  -Cont Entresto and Aldactone for HF GDMT  -Cont high dose statin with [id number] po qHS  -Start heparin Full AC at 1600 if RRA access site stable  -TTE ordered to eval ventricular function and LV thrombus  -Educated regarding post procedure management and care  -Discussed the importance of RF modification  -Cardiac rehab info provided/referral and communication to cardiac rehab completed  -DISPO: in-pt admission for severe multivessel CAD and CTS eval for possible CABG

## 2022-09-12 NOTE — CONSULT NOTE ADULT - ASSESSMENT
The patient is a 67y Male with stroke affecting left side a few months ago.   Now status post cardiac catheterization.     Stroke.   Onset of symptoms was mid July of 2022.  Now with mild left field deficit and mild left sided weakness.   On anticoagulation for thrombus.   Agree with checking CT head.   Presuming not acute ICH, he is cleared for CABG.     CAD.  Management per cardiology and CT-Surgery.     Case discussed with CT-Surgery team (JENNIFER Rodriguez/Dr Hdz).       
67M, pmhx HTN, HLD, recent MI, CVA 7/14/22 ( left hand weakness, left eye blindness)  found to have LV thrombus, now s/p elective cath with multivessel dz.

## 2022-09-13 ENCOUNTER — TRANSCRIPTION ENCOUNTER (OUTPATIENT)
Age: 67
End: 2022-09-13

## 2022-09-13 DIAGNOSIS — Z29.9 ENCOUNTER FOR PROPHYLACTIC MEASURES, UNSPECIFIED: ICD-10-CM

## 2022-09-13 LAB
A1C WITH ESTIMATED AVERAGE GLUCOSE RESULT: 6.1 % — HIGH (ref 4–5.6)
ABO RH CONFIRMATION: SIGNIFICANT CHANGE UP
ALBUMIN SERPL ELPH-MCNC: 4 G/DL — SIGNIFICANT CHANGE UP (ref 3.3–5.2)
ALP SERPL-CCNC: 54 U/L — SIGNIFICANT CHANGE UP (ref 40–120)
ALT FLD-CCNC: 26 U/L — SIGNIFICANT CHANGE UP
ANION GAP SERPL CALC-SCNC: 12 MMOL/L — SIGNIFICANT CHANGE UP (ref 5–17)
APTT BLD: 100.5 SEC — HIGH (ref 27.5–35.5)
APTT BLD: 126.4 SEC — CRITICAL HIGH (ref 27.5–35.5)
APTT BLD: 39.7 SEC — HIGH (ref 27.5–35.5)
AST SERPL-CCNC: 20 U/L — SIGNIFICANT CHANGE UP
BILIRUB SERPL-MCNC: 1.1 MG/DL — SIGNIFICANT CHANGE UP (ref 0.4–2)
BLD GP AB SCN SERPL QL: SIGNIFICANT CHANGE UP
BUN SERPL-MCNC: 18.6 MG/DL — SIGNIFICANT CHANGE UP (ref 8–20)
CALCIUM SERPL-MCNC: 9.8 MG/DL — SIGNIFICANT CHANGE UP (ref 8.4–10.5)
CHLORIDE SERPL-SCNC: 103 MMOL/L — SIGNIFICANT CHANGE UP (ref 98–107)
CHOLEST SERPL-MCNC: 140 MG/DL — SIGNIFICANT CHANGE UP
CO2 SERPL-SCNC: 22 MMOL/L — SIGNIFICANT CHANGE UP (ref 22–29)
CREAT SERPL-MCNC: 0.67 MG/DL — SIGNIFICANT CHANGE UP (ref 0.5–1.3)
EGFR: 101 ML/MIN/1.73M2 — SIGNIFICANT CHANGE UP
ESTIMATED AVERAGE GLUCOSE: 128 MG/DL — HIGH (ref 68–114)
GLUCOSE SERPL-MCNC: 100 MG/DL — HIGH (ref 70–99)
HCT VFR BLD CALC: 45.3 % — SIGNIFICANT CHANGE UP (ref 39–50)
HCT VFR BLD CALC: 48.7 % — SIGNIFICANT CHANGE UP (ref 39–50)
HDLC SERPL-MCNC: 42 MG/DL — SIGNIFICANT CHANGE UP
HGB BLD-MCNC: 15 G/DL — SIGNIFICANT CHANGE UP (ref 13–17)
HGB BLD-MCNC: 16.1 G/DL — SIGNIFICANT CHANGE UP (ref 13–17)
INR BLD: 1.2 RATIO — HIGH (ref 0.88–1.16)
LIPID PNL WITH DIRECT LDL SERPL: 85 MG/DL — SIGNIFICANT CHANGE UP
MAGNESIUM SERPL-MCNC: 2.1 MG/DL — SIGNIFICANT CHANGE UP (ref 1.6–2.6)
MCHC RBC-ENTMCNC: 28.2 PG — SIGNIFICANT CHANGE UP (ref 27–34)
MCHC RBC-ENTMCNC: 28.6 PG — SIGNIFICANT CHANGE UP (ref 27–34)
MCHC RBC-ENTMCNC: 33.1 GM/DL — SIGNIFICANT CHANGE UP (ref 32–36)
MCHC RBC-ENTMCNC: 33.1 GM/DL — SIGNIFICANT CHANGE UP (ref 32–36)
MCV RBC AUTO: 85.3 FL — SIGNIFICANT CHANGE UP (ref 80–100)
MCV RBC AUTO: 86.5 FL — SIGNIFICANT CHANGE UP (ref 80–100)
MRSA PCR RESULT.: SIGNIFICANT CHANGE UP
NON HDL CHOLESTEROL: 98 MG/DL — SIGNIFICANT CHANGE UP
NT-PROBNP SERPL-SCNC: 672 PG/ML — HIGH (ref 0–300)
PA ADP PRP-ACNC: 153 PRU — LOW (ref 180–376)
PHOSPHATE SERPL-MCNC: 3.3 MG/DL — SIGNIFICANT CHANGE UP (ref 2.4–4.7)
PLATELET # BLD AUTO: 176 K/UL — SIGNIFICANT CHANGE UP (ref 150–400)
PLATELET # BLD AUTO: 176 K/UL — SIGNIFICANT CHANGE UP (ref 150–400)
POTASSIUM SERPL-MCNC: 4.4 MMOL/L — SIGNIFICANT CHANGE UP (ref 3.5–5.3)
POTASSIUM SERPL-SCNC: 4.4 MMOL/L — SIGNIFICANT CHANGE UP (ref 3.5–5.3)
PREALB SERPL-MCNC: 22 MG/DL — SIGNIFICANT CHANGE UP (ref 18–38)
PROT SERPL-MCNC: 7 G/DL — SIGNIFICANT CHANGE UP (ref 6.6–8.7)
PROTHROM AB SERPL-ACNC: 13.9 SEC — HIGH (ref 10.5–13.4)
RBC # BLD: 5.31 M/UL — SIGNIFICANT CHANGE UP (ref 4.2–5.8)
RBC # BLD: 5.63 M/UL — SIGNIFICANT CHANGE UP (ref 4.2–5.8)
RBC # FLD: 14.2 % — SIGNIFICANT CHANGE UP (ref 10.3–14.5)
RBC # FLD: 14.4 % — SIGNIFICANT CHANGE UP (ref 10.3–14.5)
S AUREUS DNA NOSE QL NAA+PROBE: DETECTED
SARS-COV-2 RNA SPEC QL NAA+PROBE: SIGNIFICANT CHANGE UP
SODIUM SERPL-SCNC: 137 MMOL/L — SIGNIFICANT CHANGE UP (ref 135–145)
T3FREE SERPL-MCNC: 2.56 PG/ML — SIGNIFICANT CHANGE UP (ref 2–4.4)
T4 FREE SERPL-MCNC: 1.1 NG/DL — SIGNIFICANT CHANGE UP (ref 0.9–1.8)
TRIGL SERPL-MCNC: 65 MG/DL — SIGNIFICANT CHANGE UP
TSH SERPL-MCNC: 2.2 UIU/ML — SIGNIFICANT CHANGE UP (ref 0.27–4.2)
WBC # BLD: 6.58 K/UL — SIGNIFICANT CHANGE UP (ref 3.8–10.5)
WBC # BLD: 7.82 K/UL — SIGNIFICANT CHANGE UP (ref 3.8–10.5)
WBC # FLD AUTO: 6.58 K/UL — SIGNIFICANT CHANGE UP (ref 3.8–10.5)
WBC # FLD AUTO: 7.82 K/UL — SIGNIFICANT CHANGE UP (ref 3.8–10.5)

## 2022-09-13 PROCEDURE — 71045 X-RAY EXAM CHEST 1 VIEW: CPT | Mod: 26

## 2022-09-13 PROCEDURE — 99233 SBSQ HOSP IP/OBS HIGH 50: CPT

## 2022-09-13 PROCEDURE — 99232 SBSQ HOSP IP/OBS MODERATE 35: CPT | Mod: FS,57

## 2022-09-13 RX ORDER — CEFUROXIME AXETIL 250 MG
1500 TABLET ORAL ONCE
Refills: 0 | Status: COMPLETED | OUTPATIENT
Start: 2022-09-14 | End: 2022-09-14

## 2022-09-13 RX ORDER — VANCOMYCIN HCL 1 G
1500 VIAL (EA) INTRAVENOUS ONCE
Refills: 0 | Status: COMPLETED | OUTPATIENT
Start: 2022-09-14 | End: 2022-09-14

## 2022-09-13 RX ORDER — CHLORHEXIDINE GLUCONATE 213 G/1000ML
15 SOLUTION TOPICAL
Refills: 0 | Status: DISCONTINUED | OUTPATIENT
Start: 2022-09-13 | End: 2022-09-14

## 2022-09-13 RX ORDER — ACETAMINOPHEN 500 MG
650 TABLET ORAL EVERY 6 HOURS
Refills: 0 | Status: DISCONTINUED | OUTPATIENT
Start: 2022-09-13 | End: 2022-09-14

## 2022-09-13 RX ORDER — CHLORHEXIDINE GLUCONATE 213 G/1000ML
1 SOLUTION TOPICAL
Refills: 0 | Status: DISCONTINUED | OUTPATIENT
Start: 2022-09-13 | End: 2022-09-14

## 2022-09-13 RX ADMIN — HEPARIN SODIUM 1300 UNIT(S)/HR: 5000 INJECTION INTRAVENOUS; SUBCUTANEOUS at 19:29

## 2022-09-13 RX ADMIN — SODIUM CHLORIDE 3 MILLILITER(S): 9 INJECTION INTRAMUSCULAR; INTRAVENOUS; SUBCUTANEOUS at 05:07

## 2022-09-13 RX ADMIN — CHLORHEXIDINE GLUCONATE 15 MILLILITER(S): 213 SOLUTION TOPICAL at 13:46

## 2022-09-13 RX ADMIN — Medication 25 MILLIGRAM(S): at 05:03

## 2022-09-13 RX ADMIN — HEPARIN SODIUM 1500 UNIT(S)/HR: 5000 INJECTION INTRAVENOUS; SUBCUTANEOUS at 20:35

## 2022-09-13 RX ADMIN — SODIUM CHLORIDE 3 MILLILITER(S): 9 INJECTION INTRAMUSCULAR; INTRAVENOUS; SUBCUTANEOUS at 21:32

## 2022-09-13 RX ADMIN — SPIRONOLACTONE 12.5 MILLIGRAM(S): 25 TABLET, FILM COATED ORAL at 05:02

## 2022-09-13 RX ADMIN — HEPARIN SODIUM 3500 UNIT(S): 5000 INJECTION INTRAVENOUS; SUBCUTANEOUS at 20:39

## 2022-09-13 RX ADMIN — SACUBITRIL AND VALSARTAN 1 TABLET(S): 24; 26 TABLET, FILM COATED ORAL at 05:03

## 2022-09-13 RX ADMIN — HEPARIN SODIUM 1500 UNIT(S)/HR: 5000 INJECTION INTRAVENOUS; SUBCUTANEOUS at 04:10

## 2022-09-13 RX ADMIN — Medication 81 MILLIGRAM(S): at 13:47

## 2022-09-13 RX ADMIN — HEPARIN SODIUM 1300 UNIT(S)/HR: 5000 INJECTION INTRAVENOUS; SUBCUTANEOUS at 13:41

## 2022-09-13 RX ADMIN — ATORVASTATIN CALCIUM 80 MILLIGRAM(S): 80 TABLET, FILM COATED ORAL at 21:31

## 2022-09-13 RX ADMIN — SODIUM CHLORIDE 3 MILLILITER(S): 9 INJECTION INTRAMUSCULAR; INTRAVENOUS; SUBCUTANEOUS at 16:54

## 2022-09-13 RX ADMIN — HEPARIN SODIUM 1500 UNIT(S)/HR: 5000 INJECTION INTRAVENOUS; SUBCUTANEOUS at 08:08

## 2022-09-13 RX ADMIN — CHLORHEXIDINE GLUCONATE 1 APPLICATION(S): 213 SOLUTION TOPICAL at 17:03

## 2022-09-13 NOTE — CHART NOTE - NSCHARTNOTEFT_GEN_A_CORE
RRA soft, nontender, no hematoma, no bleed, DP 2+, neurovascular.  Denies CP/sob, palpitations.   Under service of Dr. Kurtz

## 2022-09-13 NOTE — PROGRESS NOTE ADULT - SUBJECTIVE AND OBJECTIVE BOX
[facility]                                        Neurology at [place]                                 Mary Corado & Cuco                                  [address]                                        [place], [place], [address]                                             [phone]        CC: Stroke    HPI:   The patient is a 67y Male with history of stroke in July of 2022 with mild left residual.   Work up revealed intracardiac thrombus and the patient has been on anticoagulation in addition to antiplatelet and statin.  Now presenting with cardiac issues.   Neurology called for clearance for CABG.     Interim history:  On 4 Tower.   Awaiting CABG.    ROS:   Denies headache or dizziness.  Denies chest pain.  Denies shortness of breath.    MEDICATIONS  (STANDING):  aspirin  chewable 81 milliGRAM(s) Oral daily  atorvastatin 80 milliGRAM(s) Oral at bedtime  chlorhexidine 0.12% Liquid 15 milliLiter(s) Swish and Spit two times a day  chlorhexidine 4% Liquid 1 Application(s) Topical two times a day  heparin  Infusion.  Unit(s)/Hr (17 mL/Hr) IV Continuous <Continuous>  metoprolol succinate ER 25 milliGRAM(s) Oral daily  sodium chloride 0.9% lock flush 3 milliLiter(s) IV Push every 8 hours  spironolactone 12.5 milliGRAM(s) Oral daily    Vital Signs Last 24 Hrs  T(C): 36.7 (13 Sep 2022 04:00), Max: 36.7 (12 Sep 2022 16:04)  T(F): 98 (13 Sep 2022 04:00), Max: 98.1 (12 Sep 2022 16:04)  HR: 71 (13 Sep 2022 04:00) (66 - 75)  BP: 113/73 (13 Sep 2022 04:00) (111/62 - 119/71)  RR: 18 (13 Sep 2022 04:00) (16 - 18)  SpO2: 95% (13 Sep 2022 04:00) (95% - 99%)    Parameters below as of 13 Sep 2022 04:00  Patient On (Oxygen Delivery Method): room air    Detailed Neurologic Exam:    Mental status: The patient is awake and alert. There is no aphasia. There is no dysarthria.     Cranial nerves: Pupils equal and react symmetrically to light. There is no visual field deficit to threat. Extraocular motion is full with no nystagmus. Facial sensation is intact. Facial musculature is symmetric. Palate elevates symmetrically. Tongue is midline.    Motor: There is normal bulk and tone.  Strength is 5/5 in the right arm and leg.   Strength is 5/5 in the left arm and leg. There is decreased fine finger movement on the left.    Sensation: Intact to light touch and pin. There is no extinction to double simultaneous stimulation.    Reflexes: 1+ throughout and plantar responses are flexor.    Cerebellar: There is no dysmetria on finger to nose testing.    Labs:     09-13    137  |  103  |  18.6  ----------------------------<  100<H>  4.4   |  22.0  |  0.67    Ca    9.8      13 Sep 2022 05:52  Phos  3.3     09-13  Mg     2.1     09-13    TPro  7.0  /  Alb  4.0  /  TBili  1.1  /  DBili  x   /  AST  20  /  ALT  26  /  AlkPhos  54  09-13                            16.1   6.58  )-----------( 176      ( 13 Sep 2022 05:52 )             48.7       Rad:   CT head images reviewed (and concur with report): There is no acute hemorrhage.  Chronic right MCA infarct and chronic left occipital and pontine infarcts (new since 2013).

## 2022-09-13 NOTE — PROGRESS NOTE ADULT - SUBJECTIVE AND OBJECTIVE BOX
Multivessel CAD, preoperative evaluation for CABG    PAST MEDICAL & SURGICAL HISTORY:  Hypertension  Hyperlipidemia  CVA (cerebrovascular accident)  Intracardiac thrombus  Abnormal nuclear stress test  Cardiomyopathy  No significant past surgical history    Brief Hospital Course: 67M, pmhx HTN, HLD, recent MI, CVA 22 (residual left hand weakness, left eye blindness), found to have LV thrombus, now s/p elective cath with Multivessel CAD (dLMCA 80%, pLAD 90%, mLAD 95%, pLCx 80%, mRCA 40%). Patient now undergoing workup for CABG surgery.     Significant recent/past 24 hr events: No overnight events reported.    Subjective: Patient lying in bed in NAD. +Tolerating diet. +Passing BMs. +Pain currently controlled. Denies fevers, chills, lightheadedness, dizziness, HA, CP, palpitations, SOB, cough, abdominal pain, N/V, diarrhea, numbness/tingling in extremities, or any other acute complaints. ROS negative x 10 systems except as noted above.    MEDICATIONS  (STANDING):  aspirin  chewable 81 milliGRAM(s) Oral daily  atorvastatin 80 milliGRAM(s) Oral at bedtime  heparin  Infusion.  Unit(s)/Hr (17 mL/Hr) IV Continuous   metoprolol succinate ER 25 milliGRAM(s) Oral daily  sacubitril 24 mG/valsartan 26 mG 1 Tablet(s) Oral two times a day  sodium chloride 0.9% lock flush 3 milliLiter(s) IV Push every 8 hours  spironolactone 12.5 milliGRAM(s) Oral daily    MEDICATIONS  (PRN):  heparin   Injectable 7500 Unit(s) IV Push every 6 hours PRN For aPTT less than 40  heparin   Injectable 3500 Unit(s) IV Push every 6 hours PRN For aPTT between 40 - 57    Allergies: No Known Allergies    Vitals   T(C): 36.4 (12 Sep 2022 20:00), Max: 36.7 (12 Sep 2022 09:25)  T(F): 97.6 (12 Sep 2022 20:00), Max: 98.1 (12 Sep 2022 16:04)  HR: 75 (12 Sep 2022 20:00) (66 - 83)  BP: 116/68 (12 Sep 2022 20:00) (107/68 - 122/57)  RR: 18 (12 Sep 2022 20:00) (16 - 18)  SpO2: 98% (12 Sep 2022 20:00) (97% - 99%)  O2 Parameters below as of 12 Sep 2022 20:00  Patient On (Oxygen Delivery Method): room air    Physical Exam  Neuro: A+O x 3, non-focal, speech clear and intact  HEENT:  NCAT, No conjuctival edema or icterus, no thrush.    Neck:  Supple, trachea midline  Pulm: CTA bilaterally, no rales/rhonchi/wheezing, no accessory muscle use noted  CV: regular rate, regular rhythm, +S1S2, no murmur or rub noted  Abd: soft, NT, ND, + BS  Ext: GARCIA x 4, no edema, no cyanosis, distal motor/neuro/circ intact  Skin: warm, dry, well perfused    LABS                        15.8   7.03  )-----------( 216      ( 12 Sep 2022 08:40 )             47.2     -    139  |  103  |  20.1<H>  ----------------------------<  106<H>  3.9   |  26.0  |  0.85    Ca    10.0      12 Sep 2022 08:40  Mg     2.1     09-12    PT/INR - ( 12 Sep 2022 09:30 )   PT: 15.5 sec;   INR: 1.33 ratio      PTT - ( 12 Sep 2022 21:31 )  PTT:31.3 sec    Urinalysis Basic - ( 12 Sep 2022 20:00 )  Color: Yellow / Appearance: Clear / S.020 / pH: x  Gluc: x / Ketone: Trace  / Bili: Negative / Urobili: Negative mg/dL   Blood: x / Protein: Negative / Nitrite: Negative   Leuk Esterase: Negative / RBC: x / WBC x   Sq Epi: x / Non Sq Epi: x / Bacteria: x      Last CXR:  Ordered and pending.     Carotid US:  < from: US Duplex Carotid Arteries Complete, Bilateral (22 @ 20:09) >  Antegrade flow is noted within both vertebral arteries.  IMPRESSION: No significant hemodynamic stenosis of either internal carotid artery.  < end of copied text >    CT Head:  < from: CT Head No Cont (22 @ 19:19) >  IMPRESSION: Moderate atrophy has developed since 2013. There are old infarcts in the right middle cerebral artery distribution, left occipital lobe and lukasz which have developed the prior exam. No hemorrhage.  < end of copied text >

## 2022-09-14 ENCOUNTER — RESULT REVIEW (OUTPATIENT)
Age: 67
End: 2022-09-14

## 2022-09-14 ENCOUNTER — TRANSCRIPTION ENCOUNTER (OUTPATIENT)
Age: 67
End: 2022-09-14

## 2022-09-14 ENCOUNTER — APPOINTMENT (OUTPATIENT)
Dept: CARDIOTHORACIC SURGERY | Facility: HOSPITAL | Age: 67
End: 2022-09-14

## 2022-09-14 LAB
ALBUMIN SERPL ELPH-MCNC: 3.9 G/DL — SIGNIFICANT CHANGE UP (ref 3.3–5.2)
ALP SERPL-CCNC: 41 U/L — SIGNIFICANT CHANGE UP (ref 40–120)
ALT FLD-CCNC: 24 U/L — SIGNIFICANT CHANGE UP
ANION GAP SERPL CALC-SCNC: 13 MMOL/L — SIGNIFICANT CHANGE UP (ref 5–17)
ANISOCYTOSIS BLD QL: SLIGHT — SIGNIFICANT CHANGE UP
APTT BLD: 162.2 SEC — CRITICAL HIGH (ref 27.5–35.5)
APTT BLD: 21.6 SEC — LOW (ref 27.5–35.5)
APTT BLD: 28.6 SEC — SIGNIFICANT CHANGE UP (ref 27.5–35.5)
AST SERPL-CCNC: 30 U/L — SIGNIFICANT CHANGE UP
BASOPHILS # BLD AUTO: 0 K/UL — SIGNIFICANT CHANGE UP (ref 0–0.2)
BASOPHILS NFR BLD AUTO: 0 % — SIGNIFICANT CHANGE UP (ref 0–2)
BILIRUB SERPL-MCNC: 1.5 MG/DL — SIGNIFICANT CHANGE UP (ref 0.4–2)
BUN SERPL-MCNC: 16.9 MG/DL — SIGNIFICANT CHANGE UP (ref 8–20)
CALCIUM SERPL-MCNC: 9.4 MG/DL — SIGNIFICANT CHANGE UP (ref 8.4–10.5)
CHLORIDE SERPL-SCNC: 101 MMOL/L — SIGNIFICANT CHANGE UP (ref 98–107)
CK MB CFR SERPL CALC: 9.6 NG/ML — HIGH (ref 0–6.7)
CK SERPL-CCNC: 151 U/L — SIGNIFICANT CHANGE UP (ref 30–200)
CO2 SERPL-SCNC: 25 MMOL/L — SIGNIFICANT CHANGE UP (ref 22–29)
CREAT SERPL-MCNC: 0.77 MG/DL — SIGNIFICANT CHANGE UP (ref 0.5–1.3)
EGFR: 97 ML/MIN/1.73M2 — SIGNIFICANT CHANGE UP
EOSINOPHIL # BLD AUTO: 0.14 K/UL — SIGNIFICANT CHANGE UP (ref 0–0.5)
EOSINOPHIL NFR BLD AUTO: 0.9 % — SIGNIFICANT CHANGE UP (ref 0–6)
GAS PNL BLDA: SIGNIFICANT CHANGE UP
GLUCOSE BLDC GLUCOMTR-MCNC: 143 MG/DL — HIGH (ref 70–99)
GLUCOSE SERPL-MCNC: 135 MG/DL — HIGH (ref 70–99)
HCT VFR BLD CALC: 31.1 % — LOW (ref 39–50)
HCT VFR BLD CALC: 42.7 % — SIGNIFICANT CHANGE UP (ref 39–50)
HGB BLD-MCNC: 11 G/DL — LOW (ref 13–17)
HGB BLD-MCNC: 14.7 G/DL — SIGNIFICANT CHANGE UP (ref 13–17)
INR BLD: 1.38 RATIO — HIGH (ref 0.88–1.16)
LYMPHOCYTES # BLD AUTO: 0.56 K/UL — LOW (ref 1–3.3)
LYMPHOCYTES # BLD AUTO: 3.5 % — LOW (ref 13–44)
MAGNESIUM SERPL-MCNC: 3.2 MG/DL — HIGH (ref 1.6–2.6)
MANUAL SMEAR VERIFICATION: SIGNIFICANT CHANGE UP
MCHC RBC-ENTMCNC: 28.7 PG — SIGNIFICANT CHANGE UP (ref 27–34)
MCHC RBC-ENTMCNC: 29.1 PG — SIGNIFICANT CHANGE UP (ref 27–34)
MCHC RBC-ENTMCNC: 34.4 GM/DL — SIGNIFICANT CHANGE UP (ref 32–36)
MCHC RBC-ENTMCNC: 35.4 GM/DL — SIGNIFICANT CHANGE UP (ref 32–36)
MCV RBC AUTO: 82.3 FL — SIGNIFICANT CHANGE UP (ref 80–100)
MCV RBC AUTO: 83.4 FL — SIGNIFICANT CHANGE UP (ref 80–100)
MICROCYTES BLD QL: SLIGHT — SIGNIFICANT CHANGE UP
MONOCYTES # BLD AUTO: 0.42 K/UL — SIGNIFICANT CHANGE UP (ref 0–0.9)
MONOCYTES NFR BLD AUTO: 2.6 % — SIGNIFICANT CHANGE UP (ref 2–14)
NEUTROPHILS # BLD AUTO: 14.88 K/UL — HIGH (ref 1.8–7.4)
NEUTROPHILS NFR BLD AUTO: 89.5 % — HIGH (ref 43–77)
NEUTS BAND # BLD: 3.5 % — SIGNIFICANT CHANGE UP (ref 0–8)
OVALOCYTES BLD QL SMEAR: SLIGHT — SIGNIFICANT CHANGE UP
PLAT MORPH BLD: NORMAL — SIGNIFICANT CHANGE UP
PLATELET # BLD AUTO: 159 K/UL — SIGNIFICANT CHANGE UP (ref 150–400)
PLATELET # BLD AUTO: 163 K/UL — SIGNIFICANT CHANGE UP (ref 150–400)
POIKILOCYTOSIS BLD QL AUTO: SLIGHT — SIGNIFICANT CHANGE UP
POLYCHROMASIA BLD QL SMEAR: SLIGHT — SIGNIFICANT CHANGE UP
POTASSIUM SERPL-MCNC: 4 MMOL/L — SIGNIFICANT CHANGE UP (ref 3.5–5.3)
POTASSIUM SERPL-SCNC: 4 MMOL/L — SIGNIFICANT CHANGE UP (ref 3.5–5.3)
PROT SERPL-MCNC: 5.8 G/DL — LOW (ref 6.6–8.7)
PROTHROM AB SERPL-ACNC: 16.1 SEC — HIGH (ref 10.5–13.4)
RBC # BLD: 3.78 M/UL — LOW (ref 4.2–5.8)
RBC # BLD: 5.12 M/UL — SIGNIFICANT CHANGE UP (ref 4.2–5.8)
RBC # FLD: 14 % — SIGNIFICANT CHANGE UP (ref 10.3–14.5)
RBC # FLD: 14.1 % — SIGNIFICANT CHANGE UP (ref 10.3–14.5)
RBC BLD AUTO: ABNORMAL
SODIUM SERPL-SCNC: 139 MMOL/L — SIGNIFICANT CHANGE UP (ref 135–145)
TROPONIN T SERPL-MCNC: 0.17 NG/ML — HIGH (ref 0–0.06)
WBC # BLD: 16 K/UL — HIGH (ref 3.8–10.5)
WBC # BLD: 6.75 K/UL — SIGNIFICANT CHANGE UP (ref 3.8–10.5)
WBC # FLD AUTO: 16 K/UL — HIGH (ref 3.8–10.5)
WBC # FLD AUTO: 6.75 K/UL — SIGNIFICANT CHANGE UP (ref 3.8–10.5)

## 2022-09-14 PROCEDURE — 33405 REPLACEMENT AORTIC VALVE OPN: CPT

## 2022-09-14 PROCEDURE — 33405 REPLACEMENT AORTIC VALVE OPN: CPT | Mod: AS

## 2022-09-14 PROCEDURE — 88311 DECALCIFY TISSUE: CPT | Mod: 26

## 2022-09-14 PROCEDURE — 71045 X-RAY EXAM CHEST 1 VIEW: CPT | Mod: 26

## 2022-09-14 PROCEDURE — 33533 CABG ARTERIAL SINGLE: CPT | Mod: AS

## 2022-09-14 PROCEDURE — 33518 CABG ARTERY-VEIN TWO: CPT

## 2022-09-14 PROCEDURE — 93010 ELECTROCARDIOGRAM REPORT: CPT

## 2022-09-14 PROCEDURE — 33518 CABG ARTERY-VEIN TWO: CPT | Mod: AS

## 2022-09-14 PROCEDURE — 33508 ENDOSCOPIC VEIN HARVEST: CPT | Mod: 59

## 2022-09-14 PROCEDURE — 33533 CABG ARTERIAL SINGLE: CPT

## 2022-09-14 PROCEDURE — 88305 TISSUE EXAM BY PATHOLOGIST: CPT | Mod: 26

## 2022-09-14 DEVICE — CANNULA MEDTRONIC VES 1 WAY 3MMX6.4CM: Type: IMPLANTABLE DEVICE | Status: FUNCTIONAL

## 2022-09-14 DEVICE — OCCL VES INT FLO-RES 2.5X12MM: Type: IMPLANTABLE DEVICE | Status: FUNCTIONAL

## 2022-09-14 DEVICE — SEALANT TISSEEL PRE FILLED FROZEN 4ML: Type: IMPLANTABLE DEVICE | Status: FUNCTIONAL

## 2022-09-14 DEVICE — ELECT PACE MYOCARDIAL TEMP ORANGE: Type: IMPLANTABLE DEVICE | Status: FUNCTIONAL

## 2022-09-14 DEVICE — CANNULA DLP AORTIC ROOT: Type: IMPLANTABLE DEVICE | Status: FUNCTIONAL

## 2022-09-14 DEVICE — SYS EXCLUSION LAA DISP ATRICLIP STD 35MM: Type: IMPLANTABLE DEVICE | Status: FUNCTIONAL

## 2022-09-14 DEVICE — CANNULA ARTERIAL 8IN 20FR: Type: IMPLANTABLE DEVICE | Status: FUNCTIONAL

## 2022-09-14 DEVICE — INTRO SHEATH PINN PERIPH 6X10 MINI GWIRE: Type: IMPLANTABLE DEVICE | Status: FUNCTIONAL

## 2022-09-14 DEVICE — DEVICE ABL CARDIOBLATE BP2: Type: IMPLANTABLE DEVICE | Status: FUNCTIONAL

## 2022-09-14 DEVICE — MEDIASTINAL CATH DRAIN 9MM: Type: IMPLANTABLE DEVICE | Status: FUNCTIONAL

## 2022-09-14 DEVICE — CATH CARDIOPLEGIA RETROGRADE: Type: IMPLANTABLE DEVICE | Status: FUNCTIONAL

## 2022-09-14 DEVICE — CANNULA IMA BULB TIP 1MMX4.4CM: Type: IMPLANTABLE DEVICE | Status: FUNCTIONAL

## 2022-09-14 DEVICE — SPONGE HSTAT SURGCEL FIBRILLAR 4X4IN: Type: IMPLANTABLE DEVICE | Status: FUNCTIONAL

## 2022-09-14 DEVICE — VALVE AORTA INSPIRIS RESILIA 25MM: Type: IMPLANTABLE DEVICE | Status: FUNCTIONAL

## 2022-09-14 DEVICE — CANNULA OPTISITE ARTERIAL 16F: Type: IMPLANTABLE DEVICE | Status: FUNCTIONAL

## 2022-09-14 DEVICE — IMPLANTABLE DEVICE: Type: IMPLANTABLE DEVICE | Status: FUNCTIONAL

## 2022-09-14 DEVICE — CARDIOBLATE SURG ABLATION PEN XL: Type: IMPLANTABLE DEVICE | Status: FUNCTIONAL

## 2022-09-14 DEVICE — HEMOSTAT ARISTA AH 5GM: Type: IMPLANTABLE DEVICE | Status: FUNCTIONAL

## 2022-09-14 DEVICE — LIGATING CLIPS AESCULAP SMALL WIDE 24: Type: IMPLANTABLE DEVICE | Status: FUNCTIONAL

## 2022-09-14 DEVICE — PATCH VASC FELT PTFE 6X6IN: Type: IMPLANTABLE DEVICE | Status: FUNCTIONAL

## 2022-09-14 DEVICE — CANNULA OPTISITE ARTERIAL PERFUSION 20 FR 6.7 MM X 24 CM: Type: IMPLANTABLE DEVICE | Status: FUNCTIONAL

## 2022-09-14 DEVICE — CATH INFUS SL 7FR 20CM: Type: IMPLANTABLE DEVICE | Status: FUNCTIONAL

## 2022-09-14 DEVICE — KIT CVC 2LUM MAC 9FR CHG: Type: IMPLANTABLE DEVICE | Status: FUNCTIONAL

## 2022-09-14 DEVICE — M25 WHT WIRE PACING TEMP: Type: IMPLANTABLE DEVICE | Status: FUNCTIONAL

## 2022-09-14 DEVICE — SYS EXCLUSION LAA DISP ATRICLIP STD 40MM: Type: IMPLANTABLE DEVICE | Status: FUNCTIONAL

## 2022-09-14 DEVICE — KIT A-LINE 1LUM 20GX12CM SAFE KIT: Type: IMPLANTABLE DEVICE | Status: FUNCTIONAL

## 2022-09-14 DEVICE — CATH THORACIC 36 STRT 6S: Type: IMPLANTABLE DEVICE | Status: FUNCTIONAL

## 2022-09-14 DEVICE — BONE WAX 2.5GM: Type: IMPLANTABLE DEVICE | Status: FUNCTIONAL

## 2022-09-14 DEVICE — SYS EXCLUSION LAA ATRICLIP STD 50MM: Type: IMPLANTABLE DEVICE | Status: FUNCTIONAL

## 2022-09-14 DEVICE — KIT A-LINE 1LUM 18GAX16CM: Type: IMPLANTABLE DEVICE | Status: FUNCTIONAL

## 2022-09-14 DEVICE — OCCL VES INT FLO-RES 1X12MM: Type: IMPLANTABLE DEVICE | Status: FUNCTIONAL

## 2022-09-14 DEVICE — KIT INSERTION PERCUTANEOUS: Type: IMPLANTABLE DEVICE | Status: FUNCTIONAL

## 2022-09-14 DEVICE — SPONGE HSTAT SURGICEL 2X14": Type: IMPLANTABLE DEVICE | Status: FUNCTIONAL

## 2022-09-14 DEVICE — SYS EXCLUSION LAA DISP ATRICLIP STD 45MM: Type: IMPLANTABLE DEVICE | Status: FUNCTIONAL

## 2022-09-14 DEVICE — OCCL VES INT FLO-RES 2X12MM: Type: IMPLANTABLE DEVICE | Status: FUNCTIONAL

## 2022-09-14 DEVICE — CANNULA FEM PERQ 25FR MULTI STAGE VENOUS: Type: IMPLANTABLE DEVICE | Status: FUNCTIONAL

## 2022-09-14 DEVICE — CANNULA VENOUS DUAL STGE SINGLE 29/29: Type: IMPLANTABLE DEVICE | Status: FUNCTIONAL

## 2022-09-14 DEVICE — CANNULA OPTISITE ARTERIAL PERFUSION 18 FR 8 MM X 18 CM: Type: IMPLANTABLE DEVICE | Status: FUNCTIONAL

## 2022-09-14 DEVICE — CLAMP CARDIOBLATE GEMINI-S FT: Type: IMPLANTABLE DEVICE | Status: FUNCTIONAL

## 2022-09-14 DEVICE — CATH THERMODIL PACE 7.5FR: Type: IMPLANTABLE DEVICE | Status: FUNCTIONAL

## 2022-09-14 DEVICE — PATCH VASC FELT PTFE 4X4IN: Type: IMPLANTABLE DEVICE | Status: FUNCTIONAL

## 2022-09-14 DEVICE — CATH THERMAL OXI SWAN GANZ DILUTION 7.5FR: Type: IMPLANTABLE DEVICE | Status: FUNCTIONAL

## 2022-09-14 DEVICE — BIOGLUE 5ML SYR: Type: IMPLANTABLE DEVICE | Status: FUNCTIONAL

## 2022-09-14 DEVICE — CANNULA FEM PERF 21FR: Type: IMPLANTABLE DEVICE | Status: FUNCTIONAL

## 2022-09-14 DEVICE — HEARTSTRING III PROXIMAL SEAL SYSTEM: Type: IMPLANTABLE DEVICE | Status: FUNCTIONAL

## 2022-09-14 RX ORDER — METOCLOPRAMIDE HCL 10 MG
10 TABLET ORAL EVERY 8 HOURS
Refills: 0 | Status: DISCONTINUED | OUTPATIENT
Start: 2022-09-14 | End: 2022-09-14

## 2022-09-14 RX ORDER — INSULIN HUMAN 100 [IU]/ML
2 INJECTION, SOLUTION SUBCUTANEOUS
Qty: 50 | Refills: 0 | Status: DISCONTINUED | OUTPATIENT
Start: 2022-09-14 | End: 2022-09-16

## 2022-09-14 RX ORDER — FERROUS SULFATE 325(65) MG
325 TABLET ORAL DAILY
Refills: 0 | Status: DISCONTINUED | OUTPATIENT
Start: 2022-09-14 | End: 2022-09-19

## 2022-09-14 RX ORDER — VANCOMYCIN HCL 1 G
1500 VIAL (EA) INTRAVENOUS EVERY 12 HOURS
Refills: 0 | Status: COMPLETED | OUTPATIENT
Start: 2022-09-15 | End: 2022-09-16

## 2022-09-14 RX ORDER — SODIUM CHLORIDE 9 MG/ML
1000 INJECTION INTRAMUSCULAR; INTRAVENOUS; SUBCUTANEOUS
Refills: 0 | Status: ACTIVE | OUTPATIENT
Start: 2022-09-14 | End: 2023-08-13

## 2022-09-14 RX ORDER — CHLORHEXIDINE GLUCONATE 213 G/1000ML
15 SOLUTION TOPICAL EVERY 12 HOURS
Refills: 0 | Status: DISCONTINUED | OUTPATIENT
Start: 2022-09-14 | End: 2022-09-16

## 2022-09-14 RX ORDER — CEFUROXIME AXETIL 250 MG
1500 TABLET ORAL EVERY 8 HOURS
Refills: 0 | Status: COMPLETED | OUTPATIENT
Start: 2022-09-15 | End: 2022-09-16

## 2022-09-14 RX ORDER — FENTANYL CITRATE 50 UG/ML
25 INJECTION INTRAVENOUS ONCE
Refills: 0 | Status: DISCONTINUED | OUTPATIENT
Start: 2022-09-14 | End: 2022-09-15

## 2022-09-14 RX ORDER — DESMOPRESSIN ACETATE 0.1 MG/1
28 TABLET ORAL ONCE
Refills: 0 | Status: COMPLETED | OUTPATIENT
Start: 2022-09-14 | End: 2022-09-14

## 2022-09-14 RX ORDER — ALBUMIN HUMAN 25 %
250 VIAL (ML) INTRAVENOUS ONCE
Refills: 0 | Status: COMPLETED | OUTPATIENT
Start: 2022-09-14 | End: 2022-09-14

## 2022-09-14 RX ORDER — POTASSIUM CHLORIDE 20 MEQ
10 PACKET (EA) ORAL
Refills: 0 | Status: DISCONTINUED | OUTPATIENT
Start: 2022-09-14 | End: 2022-09-15

## 2022-09-14 RX ORDER — NOREPINEPHRINE BITARTRATE/D5W 8 MG/250ML
0.05 PLASTIC BAG, INJECTION (ML) INTRAVENOUS
Qty: 8 | Refills: 0 | Status: DISCONTINUED | OUTPATIENT
Start: 2022-09-14 | End: 2022-09-15

## 2022-09-14 RX ORDER — DEXTROSE 50 % IN WATER 50 %
50 SYRINGE (ML) INTRAVENOUS
Refills: 0 | Status: DISCONTINUED | OUTPATIENT
Start: 2022-09-14 | End: 2022-09-16

## 2022-09-14 RX ORDER — PROPOFOL 10 MG/ML
5 INJECTION, EMULSION INTRAVENOUS
Qty: 1000 | Refills: 0 | Status: DISCONTINUED | OUTPATIENT
Start: 2022-09-14 | End: 2022-09-15

## 2022-09-14 RX ORDER — POTASSIUM CHLORIDE 20 MEQ
10 PACKET (EA) ORAL
Refills: 0 | Status: COMPLETED | OUTPATIENT
Start: 2022-09-14 | End: 2022-09-15

## 2022-09-14 RX ORDER — FOLIC ACID 0.8 MG
1 TABLET ORAL DAILY
Refills: 0 | Status: DISCONTINUED | OUTPATIENT
Start: 2022-09-14 | End: 2022-09-19

## 2022-09-14 RX ORDER — OXYCODONE HYDROCHLORIDE 5 MG/1
5 TABLET ORAL EVERY 4 HOURS
Refills: 0 | Status: DISCONTINUED | OUTPATIENT
Start: 2022-09-14 | End: 2022-09-18

## 2022-09-14 RX ORDER — PANTOPRAZOLE SODIUM 20 MG/1
40 TABLET, DELAYED RELEASE ORAL ONCE
Refills: 0 | Status: COMPLETED | OUTPATIENT
Start: 2022-09-14 | End: 2022-09-14

## 2022-09-14 RX ORDER — ASCORBIC ACID 60 MG
500 TABLET,CHEWABLE ORAL DAILY
Refills: 0 | Status: DISCONTINUED | OUTPATIENT
Start: 2022-09-14 | End: 2022-09-19

## 2022-09-14 RX ORDER — ACETAMINOPHEN 500 MG
650 TABLET ORAL EVERY 6 HOURS
Refills: 0 | Status: DISCONTINUED | OUTPATIENT
Start: 2022-09-14 | End: 2022-09-18

## 2022-09-14 RX ORDER — OXYCODONE HYDROCHLORIDE 5 MG/1
10 TABLET ORAL EVERY 4 HOURS
Refills: 0 | Status: DISCONTINUED | OUTPATIENT
Start: 2022-09-14 | End: 2022-09-18

## 2022-09-14 RX ORDER — PANTOPRAZOLE SODIUM 20 MG/1
40 TABLET, DELAYED RELEASE ORAL DAILY
Refills: 0 | Status: DISCONTINUED | OUTPATIENT
Start: 2022-09-15 | End: 2022-09-19

## 2022-09-14 RX ORDER — DEXTROSE 50 % IN WATER 50 %
25 SYRINGE (ML) INTRAVENOUS
Refills: 0 | Status: DISCONTINUED | OUTPATIENT
Start: 2022-09-14 | End: 2022-09-16

## 2022-09-14 RX ORDER — ALBUMIN HUMAN 25 %
100 VIAL (ML) INTRAVENOUS
Refills: 0 | Status: COMPLETED | OUTPATIENT
Start: 2022-09-14 | End: 2022-09-14

## 2022-09-14 RX ADMIN — SODIUM CHLORIDE 3 MILLILITER(S): 9 INJECTION INTRAMUSCULAR; INTRAVENOUS; SUBCUTANEOUS at 05:28

## 2022-09-14 RX ADMIN — Medication 25 MILLIGRAM(S): at 05:27

## 2022-09-14 RX ADMIN — Medication 125 MILLILITER(S): at 23:51

## 2022-09-14 RX ADMIN — SPIRONOLACTONE 12.5 MILLIGRAM(S): 25 TABLET, FILM COATED ORAL at 05:26

## 2022-09-14 RX ADMIN — CHLORHEXIDINE GLUCONATE 1 APPLICATION(S): 213 SOLUTION TOPICAL at 05:28

## 2022-09-14 RX ADMIN — PANTOPRAZOLE SODIUM 40 MILLIGRAM(S): 20 TABLET, DELAYED RELEASE ORAL at 23:05

## 2022-09-14 RX ADMIN — CHLORHEXIDINE GLUCONATE 15 MILLILITER(S): 213 SOLUTION TOPICAL at 05:27

## 2022-09-14 RX ADMIN — Medication 100 MILLIEQUIVALENT(S): at 23:00

## 2022-09-14 NOTE — BRIEF OPERATIVE NOTE - NSICDXBRIEFPREOP_GEN_ALL_CORE_FT
PRE-OP DIAGNOSIS:  CAD (coronary artery disease) 14-Sep-2022 22:32:03  Marianela Snidre  Aortic stenosis 14-Sep-2022 22:32:13  Marianela Snider

## 2022-09-14 NOTE — BRIEF OPERATIVE NOTE - NSICDXBRIEFPROCEDURE_GEN_ALL_CORE_FT
PROCEDURES:  CABG, with SIMA 14-Sep-2022 18:53:39 LIMA- LAD, SVG- OM  endoscopic vein harvest of the left greater saphenous vein Marianela Snider  Replacement, aortic valve, with SIMA 14-Sep-2022 18:53:50 severe AS found on echo intraop   25 mm Inspiris valve used Marianela Snider   PROCEDURES:  CABG, with SIMA 14-Sep-2022 18:53:39 LIMA- LAD, SVG- OM, SVG -ramus   endoscopic vein harvest of the left greater saphenous vein Marianela Snider  Replacement, aortic valve, with SIMA 14-Sep-2022 18:53:50 severe AS found on echo intraop   25 mm Inspiris valve used Marianela Snider

## 2022-09-14 NOTE — BRIEF OPERATIVE NOTE - NSICDXBRIEFPOSTOP_GEN_ALL_CORE_FT
POST-OP DIAGNOSIS:  CAD (coronary artery disease) 14-Sep-2022 22:32:38  Marianela Snider  Aortic stenosis 14-Sep-2022 22:32:48  Marianela Snider

## 2022-09-14 NOTE — BRIEF OPERATIVE NOTE - COMMENTS
patient tx to CTICU   Gtts:   Blood products given intraop:   Invasive lines placed: MUNA Miranda Radial   Indwelling catheters: suarez   Pacing wires:   Chest tubes:     No qualified resident was available to assist in this case. I have personally first assisted the Cardiothoracic Surgeon listed in this brief op note throughout the entirety of this case.

## 2022-09-14 NOTE — BRIEF OPERATIVE NOTE - OPERATION/FINDINGS
see full dictation   severe AS seen on intra op SIMA - confirmed by cardiology see full dictation   severe AS seen on intra op SIMA - confirmed by cardiology

## 2022-09-15 DIAGNOSIS — I35.0 NONRHEUMATIC AORTIC (VALVE) STENOSIS: ICD-10-CM

## 2022-09-15 LAB
ALBUMIN SERPL ELPH-MCNC: 4.2 G/DL — SIGNIFICANT CHANGE UP (ref 3.3–5.2)
ALP SERPL-CCNC: 42 U/L — SIGNIFICANT CHANGE UP (ref 40–120)
ALT FLD-CCNC: 24 U/L — SIGNIFICANT CHANGE UP
ANION GAP SERPL CALC-SCNC: 13 MMOL/L — SIGNIFICANT CHANGE UP (ref 5–17)
ANION GAP SERPL CALC-SCNC: 13 MMOL/L — SIGNIFICANT CHANGE UP (ref 5–17)
AST SERPL-CCNC: 34 U/L — SIGNIFICANT CHANGE UP
BILIRUB DIRECT SERPL-MCNC: 0.6 MG/DL — HIGH (ref 0–0.3)
BILIRUB INDIRECT FLD-MCNC: 1.1 MG/DL — HIGH (ref 0.2–1)
BILIRUB SERPL-MCNC: 1.7 MG/DL — SIGNIFICANT CHANGE UP (ref 0.4–2)
BUN SERPL-MCNC: 17.6 MG/DL — SIGNIFICANT CHANGE UP (ref 8–20)
BUN SERPL-MCNC: 24.4 MG/DL — HIGH (ref 8–20)
CALCIUM SERPL-MCNC: 9.4 MG/DL — SIGNIFICANT CHANGE UP (ref 8.4–10.5)
CALCIUM SERPL-MCNC: 9.5 MG/DL — SIGNIFICANT CHANGE UP (ref 8.4–10.5)
CHLORIDE SERPL-SCNC: 102 MMOL/L — SIGNIFICANT CHANGE UP (ref 98–107)
CHLORIDE SERPL-SCNC: 103 MMOL/L — SIGNIFICANT CHANGE UP (ref 98–107)
CK MB CFR SERPL CALC: 10 NG/ML — HIGH (ref 0–6.7)
CK SERPL-CCNC: 186 U/L — SIGNIFICANT CHANGE UP (ref 30–200)
CO2 SERPL-SCNC: 24 MMOL/L — SIGNIFICANT CHANGE UP (ref 22–29)
CO2 SERPL-SCNC: 25 MMOL/L — SIGNIFICANT CHANGE UP (ref 22–29)
CREAT SERPL-MCNC: 0.77 MG/DL — SIGNIFICANT CHANGE UP (ref 0.5–1.3)
CREAT SERPL-MCNC: 0.8 MG/DL — SIGNIFICANT CHANGE UP (ref 0.5–1.3)
EGFR: 96 ML/MIN/1.73M2 — SIGNIFICANT CHANGE UP
EGFR: 97 ML/MIN/1.73M2 — SIGNIFICANT CHANGE UP
GAS PNL BLDA: SIGNIFICANT CHANGE UP
GLUCOSE BLDC GLUCOMTR-MCNC: 125 MG/DL — HIGH (ref 70–99)
GLUCOSE BLDC GLUCOMTR-MCNC: 143 MG/DL — HIGH (ref 70–99)
GLUCOSE BLDC GLUCOMTR-MCNC: 147 MG/DL — HIGH (ref 70–99)
GLUCOSE BLDC GLUCOMTR-MCNC: 149 MG/DL — HIGH (ref 70–99)
GLUCOSE BLDC GLUCOMTR-MCNC: 152 MG/DL — HIGH (ref 70–99)
GLUCOSE BLDC GLUCOMTR-MCNC: 153 MG/DL — HIGH (ref 70–99)
GLUCOSE BLDC GLUCOMTR-MCNC: 154 MG/DL — HIGH (ref 70–99)
GLUCOSE BLDC GLUCOMTR-MCNC: 158 MG/DL — HIGH (ref 70–99)
GLUCOSE BLDC GLUCOMTR-MCNC: 158 MG/DL — HIGH (ref 70–99)
GLUCOSE BLDC GLUCOMTR-MCNC: 162 MG/DL — HIGH (ref 70–99)
GLUCOSE BLDC GLUCOMTR-MCNC: 165 MG/DL — HIGH (ref 70–99)
GLUCOSE BLDC GLUCOMTR-MCNC: 166 MG/DL — HIGH (ref 70–99)
GLUCOSE BLDC GLUCOMTR-MCNC: 172 MG/DL — HIGH (ref 70–99)
GLUCOSE BLDC GLUCOMTR-MCNC: 172 MG/DL — HIGH (ref 70–99)
GLUCOSE BLDC GLUCOMTR-MCNC: 175 MG/DL — HIGH (ref 70–99)
GLUCOSE BLDC GLUCOMTR-MCNC: 187 MG/DL — HIGH (ref 70–99)
GLUCOSE BLDC GLUCOMTR-MCNC: 193 MG/DL — HIGH (ref 70–99)
GLUCOSE SERPL-MCNC: 159 MG/DL — HIGH (ref 70–99)
GLUCOSE SERPL-MCNC: 165 MG/DL — HIGH (ref 70–99)
HCT VFR BLD CALC: 31.3 % — LOW (ref 39–50)
HGB BLD-MCNC: 11 G/DL — LOW (ref 13–17)
LACTATE SERPL-SCNC: 1.2 MMOL/L — SIGNIFICANT CHANGE UP (ref 0.5–2)
LACTATE SERPL-SCNC: 1.3 MMOL/L — SIGNIFICANT CHANGE UP (ref 0.5–2)
MAGNESIUM SERPL-MCNC: 2.9 MG/DL — HIGH (ref 1.8–2.6)
MCHC RBC-ENTMCNC: 29.2 PG — SIGNIFICANT CHANGE UP (ref 27–34)
MCHC RBC-ENTMCNC: 35.1 GM/DL — SIGNIFICANT CHANGE UP (ref 32–36)
MCV RBC AUTO: 83 FL — SIGNIFICANT CHANGE UP (ref 80–100)
PLATELET # BLD AUTO: 147 K/UL — LOW (ref 150–400)
POTASSIUM SERPL-MCNC: 4.1 MMOL/L — SIGNIFICANT CHANGE UP (ref 3.5–5.3)
POTASSIUM SERPL-MCNC: 4.2 MMOL/L — SIGNIFICANT CHANGE UP (ref 3.5–5.3)
POTASSIUM SERPL-SCNC: 4.1 MMOL/L — SIGNIFICANT CHANGE UP (ref 3.5–5.3)
POTASSIUM SERPL-SCNC: 4.2 MMOL/L — SIGNIFICANT CHANGE UP (ref 3.5–5.3)
PROT SERPL-MCNC: 6.2 G/DL — LOW (ref 6.6–8.7)
RBC # BLD: 3.77 M/UL — LOW (ref 4.2–5.8)
RBC # FLD: 14 % — SIGNIFICANT CHANGE UP (ref 10.3–14.5)
SODIUM SERPL-SCNC: 139 MMOL/L — SIGNIFICANT CHANGE UP (ref 135–145)
SODIUM SERPL-SCNC: 141 MMOL/L — SIGNIFICANT CHANGE UP (ref 135–145)
TROPONIN T SERPL-MCNC: 0.19 NG/ML — HIGH (ref 0–0.06)
WBC # BLD: 14.96 K/UL — HIGH (ref 3.8–10.5)
WBC # FLD AUTO: 14.96 K/UL — HIGH (ref 3.8–10.5)

## 2022-09-15 PROCEDURE — 93355 ECHO TRANSESOPHAGEAL (TEE): CPT

## 2022-09-15 PROCEDURE — 99232 SBSQ HOSP IP/OBS MODERATE 35: CPT

## 2022-09-15 PROCEDURE — 99024 POSTOP FOLLOW-UP VISIT: CPT

## 2022-09-15 PROCEDURE — 99291 CRITICAL CARE FIRST HOUR: CPT

## 2022-09-15 PROCEDURE — 93010 ELECTROCARDIOGRAM REPORT: CPT

## 2022-09-15 PROCEDURE — 71045 X-RAY EXAM CHEST 1 VIEW: CPT | Mod: 26

## 2022-09-15 RX ORDER — INSULIN LISPRO 100/ML
VIAL (ML) SUBCUTANEOUS
Refills: 0 | Status: DISCONTINUED | OUTPATIENT
Start: 2022-09-15 | End: 2022-09-15

## 2022-09-15 RX ORDER — ALBUMIN HUMAN 25 %
250 VIAL (ML) INTRAVENOUS ONCE
Refills: 0 | Status: COMPLETED | OUTPATIENT
Start: 2022-09-15 | End: 2022-09-15

## 2022-09-15 RX ORDER — INSULIN LISPRO 100/ML
3 VIAL (ML) SUBCUTANEOUS
Refills: 0 | Status: DISCONTINUED | OUTPATIENT
Start: 2022-09-15 | End: 2022-09-16

## 2022-09-15 RX ORDER — METOPROLOL TARTRATE 50 MG
25 TABLET ORAL EVERY 12 HOURS
Refills: 0 | Status: DISCONTINUED | OUTPATIENT
Start: 2022-09-16 | End: 2022-09-16

## 2022-09-15 RX ORDER — INSULIN LISPRO 100/ML
3 VIAL (ML) SUBCUTANEOUS
Refills: 0 | Status: DISCONTINUED | OUTPATIENT
Start: 2022-09-15 | End: 2022-09-15

## 2022-09-15 RX ORDER — ACETAMINOPHEN 500 MG
1000 TABLET ORAL ONCE
Refills: 0 | Status: COMPLETED | OUTPATIENT
Start: 2022-09-15 | End: 2022-09-15

## 2022-09-15 RX ORDER — INSULIN GLARGINE 100 [IU]/ML
12 INJECTION, SOLUTION SUBCUTANEOUS AT BEDTIME
Refills: 0 | Status: DISCONTINUED | OUTPATIENT
Start: 2022-09-15 | End: 2022-09-16

## 2022-09-15 RX ORDER — MILRINONE LACTATE 1 MG/ML
0.12 INJECTION, SOLUTION INTRAVENOUS
Qty: 20 | Refills: 0 | Status: DISCONTINUED | OUTPATIENT
Start: 2022-09-15 | End: 2022-09-15

## 2022-09-15 RX ORDER — DEXTROSE 50 % IN WATER 50 %
15 SYRINGE (ML) INTRAVENOUS ONCE
Refills: 0 | Status: DISCONTINUED | OUTPATIENT
Start: 2022-09-15 | End: 2022-09-17

## 2022-09-15 RX ORDER — ROSUVASTATIN CALCIUM 5 MG/1
40 TABLET ORAL AT BEDTIME
Refills: 0 | Status: DISCONTINUED | OUTPATIENT
Start: 2022-09-15 | End: 2022-09-19

## 2022-09-15 RX ORDER — GLUCAGON INJECTION, SOLUTION 0.5 MG/.1ML
1 INJECTION, SOLUTION SUBCUTANEOUS ONCE
Refills: 0 | Status: DISCONTINUED | OUTPATIENT
Start: 2022-09-15 | End: 2022-09-17

## 2022-09-15 RX ORDER — CLOPIDOGREL BISULFATE 75 MG/1
75 TABLET, FILM COATED ORAL DAILY
Refills: 0 | Status: DISCONTINUED | OUTPATIENT
Start: 2022-09-15 | End: 2022-09-19

## 2022-09-15 RX ORDER — ASPIRIN/CALCIUM CARB/MAGNESIUM 324 MG
81 TABLET ORAL DAILY
Refills: 0 | Status: DISCONTINUED | OUTPATIENT
Start: 2022-09-15 | End: 2022-09-19

## 2022-09-15 RX ORDER — INSULIN LISPRO 100/ML
VIAL (ML) SUBCUTANEOUS
Refills: 0 | Status: DISCONTINUED | OUTPATIENT
Start: 2022-09-15 | End: 2022-09-17

## 2022-09-15 RX ORDER — LANOLIN ALCOHOL/MO/W.PET/CERES
5 CREAM (GRAM) TOPICAL AT BEDTIME
Refills: 0 | Status: DISCONTINUED | OUTPATIENT
Start: 2022-09-15 | End: 2022-09-19

## 2022-09-15 RX ORDER — NICARDIPINE HYDROCHLORIDE 30 MG/1
5 CAPSULE, EXTENDED RELEASE ORAL
Qty: 40 | Refills: 0 | Status: DISCONTINUED | OUTPATIENT
Start: 2022-09-15 | End: 2022-09-15

## 2022-09-15 RX ORDER — ENOXAPARIN SODIUM 100 MG/ML
40 INJECTION SUBCUTANEOUS
Refills: 0 | Status: DISCONTINUED | OUTPATIENT
Start: 2022-09-15 | End: 2022-09-18

## 2022-09-15 RX ORDER — INSULIN LISPRO 100/ML
2 VIAL (ML) SUBCUTANEOUS
Refills: 0 | Status: DISCONTINUED | OUTPATIENT
Start: 2022-09-15 | End: 2022-09-15

## 2022-09-15 RX ORDER — METOPROLOL TARTRATE 50 MG
5 TABLET ORAL ONCE
Refills: 0 | Status: COMPLETED | OUTPATIENT
Start: 2022-09-15 | End: 2022-09-15

## 2022-09-15 RX ORDER — HYDROMORPHONE HYDROCHLORIDE 2 MG/ML
0.25 INJECTION INTRAMUSCULAR; INTRAVENOUS; SUBCUTANEOUS ONCE
Refills: 0 | Status: DISCONTINUED | OUTPATIENT
Start: 2022-09-15 | End: 2022-09-15

## 2022-09-15 RX ADMIN — Medication 5 MILLIGRAM(S): at 21:52

## 2022-09-15 RX ADMIN — OXYCODONE HYDROCHLORIDE 10 MILLIGRAM(S): 5 TABLET ORAL at 13:18

## 2022-09-15 RX ADMIN — Medication 81 MILLIGRAM(S): at 06:42

## 2022-09-15 RX ADMIN — Medication 125 MILLILITER(S): at 02:30

## 2022-09-15 RX ADMIN — CLOPIDOGREL BISULFATE 75 MILLIGRAM(S): 75 TABLET, FILM COATED ORAL at 13:16

## 2022-09-15 RX ADMIN — Medication 100 MILLIGRAM(S): at 17:34

## 2022-09-15 RX ADMIN — Medication 100 MILLIEQUIVALENT(S): at 00:00

## 2022-09-15 RX ADMIN — Medication 100 MILLIGRAM(S): at 00:46

## 2022-09-15 RX ADMIN — Medication 500 MILLIGRAM(S): at 13:16

## 2022-09-15 RX ADMIN — Medication 400 MILLIGRAM(S): at 02:00

## 2022-09-15 RX ADMIN — Medication 2 UNIT(S): at 17:36

## 2022-09-15 RX ADMIN — NICARDIPINE HYDROCHLORIDE 25 MG/HR: 30 CAPSULE, EXTENDED RELEASE ORAL at 13:19

## 2022-09-15 RX ADMIN — Medication 650 MILLIGRAM(S): at 07:55

## 2022-09-15 RX ADMIN — INSULIN GLARGINE 12 UNIT(S): 100 INJECTION, SOLUTION SUBCUTANEOUS at 21:51

## 2022-09-15 RX ADMIN — Medication 5 MILLIGRAM(S): at 20:37

## 2022-09-15 RX ADMIN — CHLORHEXIDINE GLUCONATE 15 MILLILITER(S): 213 SOLUTION TOPICAL at 17:34

## 2022-09-15 RX ADMIN — Medication 300 MILLIGRAM(S): at 17:36

## 2022-09-15 RX ADMIN — Medication 1 MILLIGRAM(S): at 13:16

## 2022-09-15 RX ADMIN — Medication 300 MILLIGRAM(S): at 04:29

## 2022-09-15 RX ADMIN — OXYCODONE HYDROCHLORIDE 10 MILLIGRAM(S): 5 TABLET ORAL at 13:48

## 2022-09-15 RX ADMIN — ENOXAPARIN SODIUM 40 MILLIGRAM(S): 100 INJECTION SUBCUTANEOUS at 13:18

## 2022-09-15 RX ADMIN — ROSUVASTATIN CALCIUM 40 MILLIGRAM(S): 5 TABLET ORAL at 21:52

## 2022-09-15 RX ADMIN — HYDROMORPHONE HYDROCHLORIDE 0.25 MILLIGRAM(S): 2 INJECTION INTRAMUSCULAR; INTRAVENOUS; SUBCUTANEOUS at 09:10

## 2022-09-15 RX ADMIN — Medication 1000 MILLIGRAM(S): at 02:30

## 2022-09-15 RX ADMIN — CHLORHEXIDINE GLUCONATE 15 MILLILITER(S): 213 SOLUTION TOPICAL at 06:38

## 2022-09-15 RX ADMIN — PANTOPRAZOLE SODIUM 40 MILLIGRAM(S): 20 TABLET, DELAYED RELEASE ORAL at 13:18

## 2022-09-15 RX ADMIN — Medication 125 MILLILITER(S): at 02:53

## 2022-09-15 RX ADMIN — Medication 100 MILLIGRAM(S): at 07:54

## 2022-09-15 RX ADMIN — Medication 325 MILLIGRAM(S): at 13:18

## 2022-09-15 RX ADMIN — Medication 2: at 21:52

## 2022-09-15 RX ADMIN — OXYCODONE HYDROCHLORIDE 10 MILLIGRAM(S): 5 TABLET ORAL at 06:42

## 2022-09-15 NOTE — PHYSICAL THERAPY INITIAL EVALUATION ADULT - PERTINENT HX OF CURRENT PROBLEM, REHAB EVAL
9/14 by Dr. Hdz: s/p CABG( LIMA- LAD, SVG- OM, SVG -ramus endoscopic vein harvest of the left greater saphenous vein) and Aortic valve replacement, history of recent stroke in July 2022

## 2022-09-15 NOTE — PROGRESS NOTE ADULT - SUBJECTIVE AND OBJECTIVE BOX
Subjective: no c/o incisional pain at this time. Denies CP, SOB, palpitations, N/V, other c/o.    all other ROS negative x10 except as noted above     T(C): 35.9 (09-15-22 @ 00:00), Max: 36.9 (09-14-22 @ 12:09)  HR: 76 (09-15-22 @ 00:30) (67 - 81)  BP: 129/71 (09-14-22 @ 12:16) (117/68 - 138/76)  ABP: 138/59 (09-15-22 @ 00:30) (95/42 - 138/59)  ABP(mean): 82 (09-15-22 @ 00:30) (58 - 82)  RR: 10 (09-15-22 @ 00:30) (10 - 18)  SpO2: 100% (09-15-22 @ 00:30) (95% - 100%)  Wt(kg): --  CVP(mm Hg): 2 (09-15-22 @ 00:30) (2 - 6)  CO: 7 (09-15-22 @ 00:00) (6.7 - 7.3)  CI: 3.2 (09-15-22 @ 00:00) (3.2 - 3.4)  PA: 18/9 (09-15-22 @ 00:30) (12/4 - 25/7)       I&O's Detail    13 Sep 2022 07:01  -  14 Sep 2022 07:00  --------------------------------------------------------  IN:    Heparin Infusion: 187 mL    Oral Fluid: 680 mL  Total IN: 867 mL    OUT:    Voided (mL): 1050 mL  Total OUT: 1050 mL    Total NET: -183 mL      14 Sep 2022 07:01  -  15 Sep 2022 01:18  --------------------------------------------------------  IN:    Albumin 5%  - 250 mL: 250 mL    Insulin: 3 mL    IV PiggyBack: 100 mL    IV PiggyBack: 50 mL    Lactated Ringers Bolus: 1000 mL    Norepinephrine: 3.4 mL    Propofol: 65.4 mL  Total IN: 1471.8 mL    OUT:    Chest Tube (mL): 40 mL    Chest Tube (mL): 85 mL    Indwelling Catheter - Urethral (mL): 460 mL  Total OUT: 585 mL    Total NET: 886.8 mL          LABS: All Lab data reviewed and analyzed                        11.0   16.00 )-----------( 159      ( 14 Sep 2022 22:45 )             31.1     09-14    139  |  101  |  16.9  ----------------------------<  135<H>  4.0   |  25.0  |  0.77    Ca    9.4      14 Sep 2022 22:45  Phos  3.3     09-13  Mg     3.2     09-14    TPro  5.8<L>  /  Alb  3.9  /  TBili  1.5  /  DBili  x   /  AST  30  /  ALT  24  /  AlkPhos  41  09-14    PT/INR - ( 14 Sep 2022 22:45 )   PT: 16.1 sec;   INR: 1.38 ratio         PTT - ( 14 Sep 2022 22:45 )  PTT:28.6 sec  CARDIAC MARKERS ( 14 Sep 2022 22:45 )   U/L / CKMB9.6 ng/mL / Troponin T0.17 ng/mL /      ABG - ( 14 Sep 2022 22:44 )  pH, Arterial: 7.470 pH, Blood: x     /  pCO2: 37    /  pO2: 298   / HCO3: 27    / Base Excess: 3.2   /  SaO2: 100.0             CAPILLARY BLOOD GLUCOSE      POCT Blood Glucose.: 147 mg/dL (15 Sep 2022 00:50)  POCT Blood Glucose.: 153 mg/dL (15 Sep 2022 00:13)  POCT Blood Glucose.: 143 mg/dL (14 Sep 2022 23:22)           RADIOLOGY: - Reviewed and analyzed   CXR: pending    HOSPITAL MEDICATIONS: All medications reviewed and analyzed  MEDICATIONS  (STANDING):  ascorbic acid 500 milliGRAM(s) Oral daily  cefuroxime  IVPB 1500 milliGRAM(s) IV Intermittent every 8 hours  chlorhexidine 0.12% Liquid 15 milliLiter(s) Oral Mucosa every 12 hours  dextrose 50% Injectable 50 milliLiter(s) IV Push every 15 minutes  dextrose 50% Injectable 25 milliLiter(s) IV Push every 15 minutes  fentaNYL    Injectable 25 MICROGram(s) IV Push once  ferrous    sulfate 325 milliGRAM(s) Oral daily  folic acid 1 milliGRAM(s) Oral daily  insulin regular Infusion 2 Unit(s)/Hr (2 mL/Hr) IV Continuous <Continuous>  norepinephrine Infusion 0.05 MICROgram(s)/kG/Min (8.51 mL/Hr) IV Continuous <Continuous>  pantoprazole    Tablet 40 milliGRAM(s) Oral daily  potassium chloride  10 mEq/50 mL IVPB 10 milliEquivalent(s) IV Intermittent every 1 hour  potassium chloride  10 mEq/50 mL IVPB 10 milliEquivalent(s) IV Intermittent every 1 hour  potassium chloride  10 mEq/50 mL IVPB 10 milliEquivalent(s) IV Intermittent every 1 hour  propofol Infusion 5 MICROgram(s)/kG/Min (2.72 mL/Hr) IV Continuous <Continuous>  sodium chloride 0.9%. 1000 milliLiter(s) (10 mL/Hr) IV Continuous <Continuous>  sodium chloride 0.9%. 1000 milliLiter(s) (5 mL/Hr) IV Continuous <Continuous>  vancomycin  IVPB 1500 milliGRAM(s) IV Intermittent every 12 hours    MEDICATIONS  (PRN):  acetaminophen     Tablet .. 650 milliGRAM(s) Oral every 6 hours PRN Mild Pain (1 - 3)  oxyCODONE    IR 5 milliGRAM(s) Oral every 4 hours PRN Moderate Pain (4 - 6)  oxyCODONE    IR 10 milliGRAM(s) Oral every 4 hours PRN Severe Pain (7 - 10)          Lines:     Physical Exam  Neuro: A+O x 3, non-focal, speech clear and intact  HEENT:  NCAT, PERRL, EOMI. No conjuctival edema or iIcterus, no thrush.  No ETT or NGT/OGT  Neck:  ROM intact, no JVD, no nodes or masses palpable, trachea midline, no tracheostomy  Pulm: CTA, good air entry, equal bilaterally, no rales/rhonchi/wheezing, no accessory muscle use noted  Chest:        mediastinal CT and left pleural CT all with dressings intact and no air leak, no subQ emphysema       +PW attatched to pacing box  CV: RRR, S1, S2. No murmurs, rubs, or gallops noted.  Abd: +BSx4. Soft, nontender, nondistended.  : suarez in situ to bedside drainage  Ext: GARCIA x 4, no edema, no cyanosis or clubbing, distal motor/neuro/circ intact  Skin: warm, dry, no rashes  Incisions: midsternal C/D/I/stable w/ dressing        Case including assessment/plan of care discussed with   CT surgery attending.  Care time:  35    minutes of noncontinuos critical care time spent evaluating/treating, reviewing imaging, labs, discussing case with multidisciplinary team, discussing plans/goals of care with patient/family to prevent further life threatening depreciation of the patient's condition. Non-inclusive is procedure time.       67yMale with PMH     CABG, with SIMA    Replacement, aortic valve, with SIMA        PAST MEDICAL & SURGICAL HISTORY:  Hypertension      Hyperlipidemia      CVA (cerebrovascular accident)      Intracardiac thrombus      Abnormal nuclear stress test      Cardiomyopathy      No significant past surgical history

## 2022-09-15 NOTE — DIETITIAN INITIAL EVALUATION ADULT - ORAL INTAKE PTA/DIET HISTORY
Pt reports eating well PTA; denies any recent weight changes. Pt reports taking and tolerating clear liquids well post op. Verbally reviewed therapeutic diet   guidelines. Pt declined further diet ed at this time.

## 2022-09-15 NOTE — DIETITIAN INITIAL EVALUATION ADULT - NSFNSGIIOFT_GEN_A_CORE
09-14-22 @ 07:01  -  09-15-22 @ 07:00  --------------------------------------------------------  OUT:    Chest Tube (mL): 100 mL    Chest Tube (mL): 180 mL  Total OUT: 280 mL    Total NET: -280 mL

## 2022-09-15 NOTE — DIETITIAN INITIAL EVALUATION ADULT - NS FNS DIET ORDER
Diet, Clear Liquid:   Consistent Carbohydrate {No Snacks} (CSTCHO)  DASH/TLC {Sodium & Cholesterol Restricted} (DASH) (09-15-22 @ 04:52)

## 2022-09-15 NOTE — PHYSICAL THERAPY INITIAL EVALUATION ADULT - GENERAL OBSERVATIONS, REHAB EVAL
pt received sitting in chair at bedside, NAD, agreeable to PT, pacer box, drain, 2 chest tubes, suarez, cardiac monitor, bladder temperature sensor, central line, RN and MD present

## 2022-09-15 NOTE — PROGRESS NOTE ADULT - SUBJECTIVE AND OBJECTIVE BOX
[facility]                                        Neurology at [place]                                 Mary Corado, & [address]uc[address]                                  [address]                                        [place], [place], [address]                                             [phone]        CC: Stroke    HPI:   The patient is a 67y Male with history of stroke in July of 2022 with mild left residual.   Work up revealed intracardiac thrombus and the patient has been on anticoagulation in addition to antiplatelet and statin.  Now presenting with cardiac issues.   Neurology called for clearance for     Interim history:  Now on 4 [place] status post CABG.    ROS:   Denies headache or dizziness.  Denies chest pain.  Denies shortness of breath.    MEDICATIONS  (STANDING):  ascorbic acid 500 milliGRAM(s) Oral daily  aspirin enteric coated 81 milliGRAM(s) Oral daily  cefuroxime  IVPB 1500 milliGRAM(s) IV Intermittent every 8 hours  chlorhexidine 0.12% Liquid 15 milliLiter(s) Oral Mucosa every 12 hours  clopidogrel Tablet 75 milliGRAM(s) Oral daily  dextrose 50% Injectable 50 milliLiter(s) IV Push every 15 minutes  dextrose 50% Injectable 25 milliLiter(s) IV Push every 15 minutes  enoxaparin Injectable 40 milliGRAM(s) SubCutaneous <User Schedule>  ferrous    sulfate 325 milliGRAM(s) Oral daily  folic acid 1 milliGRAM(s) Oral daily  insulin regular Infusion 2 Unit(s)/Hr (2 mL/Hr) IV Continuous <Continuous>  milrinone Infusion 0.125 MICROgram(s)/kG/Min (3.4 mL/Hr) IV Continuous <Continuous>  niCARdipine Infusion 5 mG/Hr (25 mL/Hr) IV Continuous <Continuous>  pantoprazole    Tablet 40 milliGRAM(s) Oral daily  potassium chloride  10 mEq/50 mL IVPB 10 milliEquivalent(s) IV Intermittent every 1 hour  potassium chloride  10 mEq/50 mL IVPB 10 milliEquivalent(s) IV Intermittent every 1 hour  potassium chloride  10 mEq/50 mL IVPB 10 milliEquivalent(s) IV Intermittent every 1 hour  rosuvastatin 40 milliGRAM(s) Oral at bedtime  sodium chloride 0.9%. 1000 milliLiter(s) (10 mL/Hr) IV Continuous <Continuous>  sodium chloride 0.9%. 1000 milliLiter(s) (5 mL/Hr) IV Continuous <Continuous>  vancomycin  IVPB 1500 milliGRAM(s) IV Intermittent every 12 hours    Vital Signs Last 24 Hrs  T(C): 36.5 (15 Sep 2022 12:00), Max: 36.8 (15 Sep 2022 02:00)  T(F): 97.7 (15 Sep 2022 12:00), Max: 98.2 (15 Sep 2022 02:00)  HR: 92 (15 Sep 2022 07:00) (73 - 92)  RR: 17 (15 Sep 2022 07:00) (7 - 24)  SpO2: 95% (15 Sep 2022 07:00) (95% - 100%)    Parameters below as of 15 Sep 2022 07:00  Patient On (Oxygen Delivery Method): room air    Detailed Neurologic Exam:    Mental status: The patient is awake and alert. There is no aphasia. There is no dysarthria.     Cranial nerves: Pupils equal and react symmetrically to light. There is no visual field deficit to threat. Extraocular motion is full with no nystagmus. Facial sensation is intact. Facial musculature is asymmetric with subtle depression of the left nasolabial fold. Palate elevates symmetrically. Tongue is midline.    Motor: There is normal bulk and tone.  Strength is 5/5 in the right arm and leg.   Strength is 5/5 in the left arm and leg. There is decreased fine finger movement on the left.    Sensation: Intact to light touch and pin. There is no extinction to double simultaneous stimulation.    Reflexes: 1+ throughout and plantar responses are flexor.    Cerebellar: There is no dysmetria on finger to nose testing.    Labs:     09-15    141  |  103  |  17.6  ----------------------------<  159<H>  4.2   |  25.0  |  0.80    Ca    9.4      15 Sep 2022 02:01  Mg     2.9     09-15    TPro  6.2<L>  /  Alb  4.2  /  TBili  1.7  /  DBili  0.6<H>  /  AST  34  /  ALT  24  /  AlkPhos  42  09-15                            11.0   14.96 )-----------( 147      ( 15 Sep 2022 02:01 )             31.3       RAD:   CT head: There is no acute hemorrhage.  Chronic right MCA infarct and chronic left occipital and pontine infarcts (new since 2013).

## 2022-09-15 NOTE — PROGRESS NOTE ADULT - SUBJECTIVE AND OBJECTIVE BOX
PITER BONDS  [id number]    HPI:                                                                  67 year old male with h/o HTN, HLD, recently seen by PCP and noted to have ECG with ante QW, subsequently referred to cardiologist Dr Mirza and underwent TTE 6/10/22 and noted to have drop in LVEF to 40-45%, plan to undergo further w/u but subsequently admitted with CVA 7/14/22, possible sequale of likely recent MI c/b LV thrombus, started on coumadin, residual left sided weakness but improved, mild speech impairment and left eye vision loss, does report intermittent episodes for chest pain during stressful events, PET/CT with large reversible defect anterior/antelat/septal wall, LVEF rest 37%, stress 36%, now presents for LHC to be performed by Dr Lisa.         Surgery/Hospital Course:  ·  PRE-OP DIAGNOSIS:  CAD (coronary artery disease)   Aortic stenosis  ·  POST-OP DIAGNOSIS:  CAD (coronary artery disease)   Aortic stenosis   ·  PROCEDURES:  CABG, with SIMA 14-Sep-2022   LIMA- LAD, SVG- OM, SVG -ramus   endoscopic vein harvest of the left greater saphenous vein   Replacement, aortic valve, with SIMA 14-Sep-2022   severe AS found on echo intraop   25 mm Inspiris valve used   Today:  No acute events     ICU Vital Signs Last 24 Hrs  T(C): 36.5 (15 Sep 2022 12:00), Max: 36.9 (14 Sep 2022 12:09)  T(F): 97.7 (15 Sep 2022 12:00), Max: 98.4 (14 Sep 2022 12:09)  HR: 92 (15 Sep 2022 07:00) (73 - 92)  BP: 129/71 (14 Sep 2022 12:16) (124/66 - 129/71)  BP(mean): --  ABP: 135/57 (15 Sep 2022 07:00) (95/42 - 155/66)  ABP(mean): 81 (15 Sep 2022 07:00) (58 - 95)  RR: 17 (15 Sep 2022 07:00) (7 - 24)  SpO2: 95% (15 Sep 2022 07:00) (95% - 100%)    O2 Parameters below as of 15 Sep 2022 07:00  Patient On (Oxygen Delivery Method): room air            Physical Exam:  Gen:  Awake, alert   CNS: non focal 	  Neck: no JVD  RES : clear , no wheezing              CVS: Regular  rhythm. Normal S1/S2  Abd: Soft, non-distended. Bowel sounds present.  Skin: No rash.  Ext:  no edema    ============================I/O===========================   I&O's Detail    14 Sep 2022 07:01  -  15 Sep 2022 07:00  --------------------------------------------------------  IN:    Albumin 5%  - 250 mL: 750 mL    Insulin: 15.5 mL    IV PiggyBack: 100 mL    IV PiggyBack: 50 mL    IV PiggyBack: 100 mL    Lactated Ringers Bolus: 1000 mL    NiCARdipine: 50 mL    Norepinephrine: 3.4 mL    Propofol: 65.4 mL  Total IN: 2134.3 mL    OUT:    Chest Tube (mL): 100 mL    Chest Tube (mL): 180 mL    Indwelling Catheter - Urethral (mL): 1235 mL  Total OUT: 1515 mL    Total NET: 619.3 mL        ============================ LABS =========================                        11.0   14.96 )-----------( 147      ( 15 Sep 2022 02:01 )             31.3     09-15    141  |  103  |  17.6  ----------------------------<  159<H>  4.2   |  25.0  |  0.80    Ca    9.4      15 Sep 2022 02:01  Mg     2.9     09-15    TPro  6.2<L>  /  Alb  4.2  /  TBili  1.7  /  DBili  0.6<H>  /  AST  34  /  ALT  24  /  AlkPhos  42  09-15    LIVER FUNCTIONS - ( 15 Sep 2022 02:01 )  Alb: 4.2 g/dL / Pro: 6.2 g/dL / ALK PHOS: 42 U/L / ALT: 24 U/L / AST: 34 U/L / GGT: x           PT/INR - ( 14 Sep 2022 22:45 )   PT: 16.1 sec;   INR: 1.38 ratio         PTT - ( 14 Sep 2022 22:45 )  PTT:28.6 sec  ABG - ( 15 Sep 2022 03:15 )  pH, Arterial: 7.420 pH, Blood: x     /  pCO2: 39    /  pO2: 106   / HCO3: 25    / Base Excess: 0.8   /  SaO2: 99.2                ======================Micro/Rad/Cardio=================  Culture: Reviewed   CXR: Reviewed  Echo:Reviewed  ======================================================  PAST MEDICAL & SURGICAL HISTORY:  Hypertension      Hyperlipidemia      CVA (cerebrovascular accident)      Intracardiac thrombus      Abnormal nuclear stress test      Cardiomyopathy      No significant past surgical history        ====================ASSESSMENT ==============  66 yo M PMH HTN, HLD, recent MI, CVA 7/14/22 ( left hand weakness , left eye blindness)  found to have LV thrombus (on Coumadin at home), Carotid: WNL prealb 22TSH 2.1 A1C 6.1  9/14 LIMA- LAD, SVG- OM, SVG -ramus, 25 mm Inspiris valve  -CAD (coronary artery disease).   - Hypertension.   - Hyperlipidemia.   -CVA (cerebrovascular accident).   -Severe aortic stenosis.   -Post op Hypovolemia  -Post op respiratory insufficiency       Plan:  - Pain control with oxy ir and tylenol prn  - Encourage DBE/IS, wean NC as tolerated   - Asa therapy for graft patency  - Statin therapy for chronic graft occlusion ppx   - Protonix for GI ppx  - Chemical DVT ppx with lovenox, maintain SCD's for mechanical DVT ppx.  - resume lopressor once  off pressor.    ====================== NEUROLOGY=====================  acetaminophen     Tablet .. 650 milliGRAM(s) Oral every 6 hours PRN Mild Pain (1 - 3)  oxyCODONE    IR 5 milliGRAM(s) Oral every 4 hours PRN Moderate Pain (4 - 6)  oxyCODONE    IR 10 milliGRAM(s) Oral every 4 hours PRN Severe Pain (7 - 10)    ==================== RESPIRATORY======================  Post op respiratory insufficiency    ====================CARDIOVASCULAR==================  Post op Hypovolemia  milrinone Infusion 0.125 MICROgram(s)/kG/Min (3.4 mL/Hr) IV Continuous <Continuous>  niCARdipine Infusion 5 mG/Hr (25 mL/Hr) IV Continuous <Continuous>    ===================HEMATOLOGIC/ONC ===================  Monitor H&H/Plts    aspirin enteric coated 81 milliGRAM(s) Oral daily  clopidogrel Tablet 75 milliGRAM(s) Oral daily  enoxaparin Injectable 40 milliGRAM(s) SubCutaneous <User Schedule>    ===================== RENAL =========================  Continue monitoring urine output, I&OS, BUN/Cr     ==================== GASTROINTESTINAL===================  ascorbic acid 500 milliGRAM(s) Oral daily  ferrous    sulfate 325 milliGRAM(s) Oral daily  folic acid 1 milliGRAM(s) Oral daily  pantoprazole    Tablet 40 milliGRAM(s) Oral daily  potassium chloride  10 mEq/50 mL IVPB 10 milliEquivalent(s) IV Intermittent every 1 hour  potassium chloride  10 mEq/50 mL IVPB 10 milliEquivalent(s) IV Intermittent every 1 hour  potassium chloride  10 mEq/50 mL IVPB 10 milliEquivalent(s) IV Intermittent every 1 hour  sodium chloride 0.9%. 1000 milliLiter(s) (10 mL/Hr) IV Continuous <Continuous>  sodium chloride 0.9%. 1000 milliLiter(s) (5 mL/Hr) IV Continuous <Continuous>    =======================    ENDOCRINE  =====================  dextrose 50% Injectable 50 milliLiter(s) IV Push every 15 minutes  dextrose 50% Injectable 25 milliLiter(s) IV Push every 15 minutes  insulin regular Infusion 2 Unit(s)/Hr (2 mL/Hr) IV Continuous <Continuous>  rosuvastatin 40 milliGRAM(s) Oral at bedtime    ========================INFECTIOUS DISEASE================  cefuroxime  IVPB 1500 milliGRAM(s) IV Intermittent every 8 hours  vancomycin  IVPB 1500 milliGRAM(s) IV Intermittent every 12 hours          I have spent 35 minutes providing acute care for this critically ill patient     Patient requires continuous monitoring with bedside rhythm monitoring, pulse ox monitoring, and intermittent blood gas analysis. Care plan discussed with ICU care team. Patient remained critical and at risk for life threatening decompensation.

## 2022-09-15 NOTE — DIETITIAN INITIAL EVALUATION ADULT - PERTINENT LABORATORY DATA
09-15    141  |  103  |  17.6  ----------------------------<  159<H>  4.2   |  25.0  |  0.80    Ca    9.4      15 Sep 2022 02:01  Mg     2.9     09-15    TPro  6.2<L>  /  Alb  4.2  /  TBili  1.7  /  DBili  0.6<H>  /  AST  34  /  ALT  24  /  AlkPhos  42  09-15  POCT Blood Glucose.: 149 mg/dL (09-15-22 @ 12:09)  A1C with Estimated Average Glucose Result: 6.1 % (09-13-22 @ 05:52)

## 2022-09-15 NOTE — DIETITIAN INITIAL EVALUATION ADULT - PERTINENT MEDS FT
MEDICATIONS  (STANDING):  ascorbic acid 500 milliGRAM(s) Oral daily  aspirin enteric coated 81 milliGRAM(s) Oral daily  cefuroxime  IVPB 1500 milliGRAM(s) IV Intermittent every 8 hours  chlorhexidine 0.12% Liquid 15 milliLiter(s) Oral Mucosa every 12 hours  clopidogrel Tablet 75 milliGRAM(s) Oral daily  dextrose 50% Injectable 50 milliLiter(s) IV Push every 15 minutes  dextrose 50% Injectable 25 milliLiter(s) IV Push every 15 minutes  enoxaparin Injectable 40 milliGRAM(s) SubCutaneous <User Schedule>  ferrous    sulfate 325 milliGRAM(s) Oral daily  folic acid 1 milliGRAM(s) Oral daily  insulin regular Infusion 2 Unit(s)/Hr (2 mL/Hr) IV Continuous <Continuous>  milrinone Infusion 0.125 MICROgram(s)/kG/Min (3.4 mL/Hr) IV Continuous <Continuous>  niCARdipine Infusion 5 mG/Hr (25 mL/Hr) IV Continuous <Continuous>  pantoprazole    Tablet 40 milliGRAM(s) Oral daily  potassium chloride  10 mEq/50 mL IVPB 10 milliEquivalent(s) IV Intermittent every 1 hour  potassium chloride  10 mEq/50 mL IVPB 10 milliEquivalent(s) IV Intermittent every 1 hour  potassium chloride  10 mEq/50 mL IVPB 10 milliEquivalent(s) IV Intermittent every 1 hour  rosuvastatin 40 milliGRAM(s) Oral at bedtime  sodium chloride 0.9%. 1000 milliLiter(s) (10 mL/Hr) IV Continuous <Continuous>  sodium chloride 0.9%. 1000 milliLiter(s) (5 mL/Hr) IV Continuous <Continuous>  vancomycin  IVPB 1500 milliGRAM(s) IV Intermittent every 12 hours    MEDICATIONS  (PRN):  acetaminophen     Tablet .. 650 milliGRAM(s) Oral every 6 hours PRN Mild Pain (1 - 3)  oxyCODONE    IR 5 milliGRAM(s) Oral every 4 hours PRN Moderate Pain (4 - 6)  oxyCODONE    IR 10 milliGRAM(s) Oral every 4 hours PRN Severe Pain (7 - 10)

## 2022-09-16 LAB
ANION GAP SERPL CALC-SCNC: 11 MMOL/L — SIGNIFICANT CHANGE UP (ref 5–17)
BUN SERPL-MCNC: 26.3 MG/DL — HIGH (ref 8–20)
CALCIUM SERPL-MCNC: 9.2 MG/DL — SIGNIFICANT CHANGE UP (ref 8.4–10.5)
CHLORIDE SERPL-SCNC: 104 MMOL/L — SIGNIFICANT CHANGE UP (ref 98–107)
CO2 SERPL-SCNC: 24 MMOL/L — SIGNIFICANT CHANGE UP (ref 22–29)
CREAT SERPL-MCNC: 0.72 MG/DL — SIGNIFICANT CHANGE UP (ref 0.5–1.3)
EGFR: 99 ML/MIN/1.73M2 — SIGNIFICANT CHANGE UP
GLUCOSE BLDC GLUCOMTR-MCNC: 122 MG/DL — HIGH (ref 70–99)
GLUCOSE BLDC GLUCOMTR-MCNC: 122 MG/DL — HIGH (ref 70–99)
GLUCOSE BLDC GLUCOMTR-MCNC: 134 MG/DL — HIGH (ref 70–99)
GLUCOSE BLDC GLUCOMTR-MCNC: 148 MG/DL — HIGH (ref 70–99)
GLUCOSE SERPL-MCNC: 142 MG/DL — HIGH (ref 70–99)
HCT VFR BLD CALC: 32.8 % — LOW (ref 39–50)
HGB BLD-MCNC: 11.4 G/DL — LOW (ref 13–17)
MAGNESIUM SERPL-MCNC: 2.4 MG/DL — SIGNIFICANT CHANGE UP (ref 1.6–2.6)
MCHC RBC-ENTMCNC: 29.1 PG — SIGNIFICANT CHANGE UP (ref 27–34)
MCHC RBC-ENTMCNC: 34.8 GM/DL — SIGNIFICANT CHANGE UP (ref 32–36)
MCV RBC AUTO: 83.7 FL — SIGNIFICANT CHANGE UP (ref 80–100)
PLATELET # BLD AUTO: 156 K/UL — SIGNIFICANT CHANGE UP (ref 150–400)
POTASSIUM SERPL-MCNC: 4.4 MMOL/L — SIGNIFICANT CHANGE UP (ref 3.5–5.3)
POTASSIUM SERPL-SCNC: 4.4 MMOL/L — SIGNIFICANT CHANGE UP (ref 3.5–5.3)
RBC # BLD: 3.92 M/UL — LOW (ref 4.2–5.8)
RBC # FLD: 14.3 % — SIGNIFICANT CHANGE UP (ref 10.3–14.5)
SODIUM SERPL-SCNC: 139 MMOL/L — SIGNIFICANT CHANGE UP (ref 135–145)
SURGICAL PATHOLOGY STUDY: SIGNIFICANT CHANGE UP
WBC # BLD: 18.49 K/UL — HIGH (ref 3.8–10.5)
WBC # FLD AUTO: 18.49 K/UL — HIGH (ref 3.8–10.5)

## 2022-09-16 PROCEDURE — 99024 POSTOP FOLLOW-UP VISIT: CPT

## 2022-09-16 PROCEDURE — 71045 X-RAY EXAM CHEST 1 VIEW: CPT | Mod: 26

## 2022-09-16 PROCEDURE — 99233 SBSQ HOSP IP/OBS HIGH 50: CPT

## 2022-09-16 PROCEDURE — 93010 ELECTROCARDIOGRAM REPORT: CPT

## 2022-09-16 RX ORDER — SODIUM CHLORIDE 9 MG/ML
3 INJECTION INTRAMUSCULAR; INTRAVENOUS; SUBCUTANEOUS EVERY 8 HOURS
Refills: 0 | Status: DISCONTINUED | OUTPATIENT
Start: 2022-09-16 | End: 2022-09-19

## 2022-09-16 RX ORDER — TAMSULOSIN HYDROCHLORIDE 0.4 MG/1
0.4 CAPSULE ORAL AT BEDTIME
Refills: 0 | Status: DISCONTINUED | OUTPATIENT
Start: 2022-09-16 | End: 2022-09-19

## 2022-09-16 RX ORDER — INFLUENZA VIRUS VACCINE 15; 15; 15; 15 UG/.5ML; UG/.5ML; UG/.5ML; UG/.5ML
0.7 SUSPENSION INTRAMUSCULAR ONCE
Refills: 0 | Status: DISCONTINUED | OUTPATIENT
Start: 2022-09-16 | End: 2022-09-19

## 2022-09-16 RX ORDER — METOPROLOL TARTRATE 50 MG
50 TABLET ORAL EVERY 12 HOURS
Refills: 0 | Status: DISCONTINUED | OUTPATIENT
Start: 2022-09-16 | End: 2022-09-19

## 2022-09-16 RX ADMIN — SODIUM CHLORIDE 3 MILLILITER(S): 9 INJECTION INTRAMUSCULAR; INTRAVENOUS; SUBCUTANEOUS at 21:10

## 2022-09-16 RX ADMIN — OXYCODONE HYDROCHLORIDE 5 MILLIGRAM(S): 5 TABLET ORAL at 12:15

## 2022-09-16 RX ADMIN — Medication 650 MILLIGRAM(S): at 01:05

## 2022-09-16 RX ADMIN — Medication 650 MILLIGRAM(S): at 17:37

## 2022-09-16 RX ADMIN — Medication 1 MILLIGRAM(S): at 12:15

## 2022-09-16 RX ADMIN — Medication 325 MILLIGRAM(S): at 12:15

## 2022-09-16 RX ADMIN — Medication 300 MILLIGRAM(S): at 17:38

## 2022-09-16 RX ADMIN — Medication 650 MILLIGRAM(S): at 18:42

## 2022-09-16 RX ADMIN — ROSUVASTATIN CALCIUM 40 MILLIGRAM(S): 5 TABLET ORAL at 21:12

## 2022-09-16 RX ADMIN — Medication 3 UNIT(S): at 07:45

## 2022-09-16 RX ADMIN — CLOPIDOGREL BISULFATE 75 MILLIGRAM(S): 75 TABLET, FILM COATED ORAL at 12:16

## 2022-09-16 RX ADMIN — Medication 25 MILLIGRAM(S): at 05:16

## 2022-09-16 RX ADMIN — Medication 5 MILLIGRAM(S): at 21:12

## 2022-09-16 RX ADMIN — Medication 100 MILLIGRAM(S): at 08:15

## 2022-09-16 RX ADMIN — TAMSULOSIN HYDROCHLORIDE 0.4 MILLIGRAM(S): 0.4 CAPSULE ORAL at 21:12

## 2022-09-16 RX ADMIN — Medication 650 MILLIGRAM(S): at 00:53

## 2022-09-16 RX ADMIN — CHLORHEXIDINE GLUCONATE 15 MILLILITER(S): 213 SOLUTION TOPICAL at 05:16

## 2022-09-16 RX ADMIN — ENOXAPARIN SODIUM 40 MILLIGRAM(S): 100 INJECTION SUBCUTANEOUS at 12:15

## 2022-09-16 RX ADMIN — Medication 300 MILLIGRAM(S): at 05:16

## 2022-09-16 RX ADMIN — Medication 500 MILLIGRAM(S): at 12:16

## 2022-09-16 RX ADMIN — PANTOPRAZOLE SODIUM 40 MILLIGRAM(S): 20 TABLET, DELAYED RELEASE ORAL at 12:15

## 2022-09-16 RX ADMIN — SODIUM CHLORIDE 3 MILLILITER(S): 9 INJECTION INTRAMUSCULAR; INTRAVENOUS; SUBCUTANEOUS at 13:07

## 2022-09-16 RX ADMIN — OXYCODONE HYDROCHLORIDE 5 MILLIGRAM(S): 5 TABLET ORAL at 13:15

## 2022-09-16 RX ADMIN — Medication 50 MILLIGRAM(S): at 17:36

## 2022-09-16 RX ADMIN — Medication 81 MILLIGRAM(S): at 12:16

## 2022-09-16 RX ADMIN — Medication 100 MILLIGRAM(S): at 00:01

## 2022-09-16 NOTE — PROGRESS NOTE ADULT - SUBJECTIVE AND OBJECTIVE BOX
Subjective: no c/o incisional pain at this time. Denies CP, SOB, palpitations, N/V, other c/o.    all other ROS negative x10 except as noted above     T(C): 37.4 (09-16-22 @ 00:00), Max: 37.4 (09-16-22 @ 00:00)  HR: 94 (09-16-22 @ 01:00) (77 - 96)  BP: 127/70 (09-16-22 @ 01:00) (102/60 - 127/70)  ABP: 134/57 (09-16-22 @ 01:00) (97/42 - 155/64)  ABP(mean): 83 (09-16-22 @ 01:00) (59 - 113)  RR: 24 (09-16-22 @ 01:00) (7 - 25)  SpO2: 93% (09-16-22 @ 01:00) (93% - 100%)  Wt(kg): --  CVP(mm Hg): 4 (09-16-22 @ 01:00) (0 - 10)  CO: 8.3 (09-15-22 @ 04:00) (7.3 - 8.3)  CI: 3.8 (09-15-22 @ 04:00) (3.4 - 3.8)  PA: 20/9 (09-15-22 @ 05:00) (17/6 - 27/15)   Mode: CPAP with PS  FiO2: 30  PEEP: 5  PS: 5  MAP: 7      I&O's Detail    14 Sep 2022 07:01  -  15 Sep 2022 07:00  --------------------------------------------------------  IN:    Albumin 5%  - 250 mL: 750 mL    Insulin: 15.5 mL    IV PiggyBack: 100 mL    IV PiggyBack: 50 mL    IV PiggyBack: 100 mL    Lactated Ringers Bolus: 1000 mL    NiCARdipine: 50 mL    Norepinephrine: 3.4 mL    Propofol: 65.4 mL  Total IN: 2134.3 mL    OUT:    Chest Tube (mL): 100 mL    Chest Tube (mL): 180 mL    Indwelling Catheter - Urethral (mL): 1235 mL  Total OUT: 1515 mL    Total NET: 619.3 mL      15 Sep 2022 07:01  -  16 Sep 2022 01:32  --------------------------------------------------------  IN:    Insulin: 34 mL    IV PiggyBack: 500 mL    IV PiggyBack: 150 mL    Milrinone: 54 mL    NiCARdipine: 325 mL    Oral Fluid: 820 mL    sodium chloride 0.9%: 180 mL    sodium chloride 0.9%: 90 mL  Total IN: 2153 mL    OUT:    Chest Tube (mL): 210 mL    Chest Tube (mL): 50 mL    Indwelling Catheter - Urethral (mL): 850 mL  Total OUT: 1110 mL    Total NET: 1043 mL          LABS: All Lab data reviewed and analyzed                        11.0   14.96 )-----------( 147      ( 15 Sep 2022 02:01 )             31.3     09-15    139  |  102  |  24.4<H>  ----------------------------<  165<H>  4.1   |  24.0  |  0.77    Ca    9.5      15 Sep 2022 16:54  Mg     2.9     09-15    TPro  6.2<L>  /  Alb  4.2  /  TBili  1.7  /  DBili  0.6<H>  /  AST  34  /  ALT  24  /  AlkPhos  42  09-15    PT/INR - ( 14 Sep 2022 22:45 )   PT: 16.1 sec;   INR: 1.38 ratio         PTT - ( 14 Sep 2022 22:45 )  PTT:28.6 sec  CARDIAC MARKERS ( 15 Sep 2022 02:01 )   U/L / CKMB10.0 ng/mL / Troponin T0.19 ng/mL /      ABG - ( 15 Sep 2022 03:15 )  pH, Arterial: 7.420 pH, Blood: x     /  pCO2: 39    /  pO2: 106   / HCO3: 25    / Base Excess: 0.8   /  SaO2: 99.2              CAPILLARY BLOOD GLUCOSE      POCT Blood Glucose.: 165 mg/dL (15 Sep 2022 21:50)  POCT Blood Glucose.: 187 mg/dL (15 Sep 2022 21:00)  POCT Blood Glucose.: 193 mg/dL (15 Sep 2022 20:02)  POCT Blood Glucose.: 172 mg/dL (15 Sep 2022 19:08)  POCT Blood Glucose.: 172 mg/dL (15 Sep 2022 17:33)  POCT Blood Glucose.: 143 mg/dL (15 Sep 2022 16:32)  POCT Blood Glucose.: 125 mg/dL (15 Sep 2022 15:19)  POCT Blood Glucose.: 149 mg/dL (15 Sep 2022 12:09)  POCT Blood Glucose.: 154 mg/dL (15 Sep 2022 10:25)  POCT Blood Glucose.: 154 mg/dL (15 Sep 2022 09:08)  POCT Blood Glucose.: 152 mg/dL (15 Sep 2022 07:54)  POCT Blood Glucose.: 158 mg/dL (15 Sep 2022 07:03)  POCT Blood Glucose.: 158 mg/dL (15 Sep 2022 06:31)  POCT Blood Glucose.: 162 mg/dL (15 Sep 2022 05:12)  POCT Blood Glucose.: 154 mg/dL (15 Sep 2022 04:20)  POCT Blood Glucose.: 166 mg/dL (15 Sep 2022 03:07)  POCT Blood Glucose.: 175 mg/dL (15 Sep 2022 02:11)           RADIOLOGY: - Reviewed and analyzed   CXR: pending    HOSPITAL MEDICATIONS: All medications reviewed and analyzed  MEDICATIONS  (STANDING):  ascorbic acid 500 milliGRAM(s) Oral daily  aspirin enteric coated 81 milliGRAM(s) Oral daily  cefuroxime  IVPB 1500 milliGRAM(s) IV Intermittent every 8 hours  chlorhexidine 0.12% Liquid 15 milliLiter(s) Oral Mucosa every 12 hours  clopidogrel Tablet 75 milliGRAM(s) Oral daily  dextrose 50% Injectable 50 milliLiter(s) IV Push every 15 minutes  dextrose 50% Injectable 25 milliLiter(s) IV Push every 15 minutes  dextrose Oral Gel 15 Gram(s) Oral once  enoxaparin Injectable 40 milliGRAM(s) SubCutaneous <User Schedule>  ferrous    sulfate 325 milliGRAM(s) Oral daily  folic acid 1 milliGRAM(s) Oral daily  glucagon  Injectable 1 milliGRAM(s) IntraMuscular once  insulin glargine Injectable (LANTUS) 12 Unit(s) SubCutaneous at bedtime  insulin lispro (ADMELOG) corrective regimen sliding scale   SubCutaneous Before meals and at bedtime  insulin lispro Injectable (ADMELOG) 3 Unit(s) SubCutaneous three times a day before meals  insulin regular Infusion 2 Unit(s)/Hr (2 mL/Hr) IV Continuous <Continuous>  melatonin 5 milliGRAM(s) Oral at bedtime  metoprolol tartrate 25 milliGRAM(s) Oral every 12 hours  pantoprazole    Tablet 40 milliGRAM(s) Oral daily  rosuvastatin 40 milliGRAM(s) Oral at bedtime  sodium chloride 0.9%. 1000 milliLiter(s) (10 mL/Hr) IV Continuous <Continuous>  sodium chloride 0.9%. 1000 milliLiter(s) (5 mL/Hr) IV Continuous <Continuous>  vancomycin  IVPB 1500 milliGRAM(s) IV Intermittent every 12 hours    MEDICATIONS  (PRN):  acetaminophen     Tablet .. 650 milliGRAM(s) Oral every 6 hours PRN Mild Pain (1 - 3)  oxyCODONE    IR 5 milliGRAM(s) Oral every 4 hours PRN Moderate Pain (4 - 6)  oxyCODONE    IR 10 milliGRAM(s) Oral every 4 hours PRN Severe Pain (7 - 10)          Lines:     Physical Exam  Neuro: A+O x 3, non-focal, speech clear and intact  HEENT:  NCAT, PERRL, EOMI. No conjuctival edema or iIcterus, no thrush.  No ETT or NGT/OGT  Neck:  ROM intact, no JVD, no nodes or masses palpable, trachea midline, no tracheostomy  Pulm: CTA, good air entry, equal bilaterally, no rales/rhonchi/wheezing, no accessory muscle use noted  Chest:        mediastinal CT and left pleural CT all with dressings intact and no air leak, no subQ emphysema       +PW attatched to pacing box  CV: RRR, S1, S2. No murmurs, rubs, or gallops noted.  Abd: +BSx4. Soft, nontender, nondistended.  : suarez in situ to bedside drainage  Ext: GARCIA x 4, no edema, no cyanosis or clubbing, distal motor/neuro/circ intact  Skin: warm, dry, no rashes  Incisions: midsternal C/D/I/stable w/ dressing        Case including assessment/plan of care discussed with   CT surgery attending.   Care time: 35     minutes of noncontinuos critical care time spent evaluating/treating, reviewing imaging, labs, discussing case with multidisciplinary team, discussing plans/goals of care with patient/family to prevent further life threatening depreciation of the patient's condition. Non-inclusive is procedure time.       67yMale with PMH     CABG, with SIMA    Replacement, aortic valve, with SIMA        PAST MEDICAL & SURGICAL HISTORY:  Hypertension      Hyperlipidemia      CVA (cerebrovascular accident)      Intracardiac thrombus      Abnormal nuclear stress test      Cardiomyopathy      No significant past surgical history

## 2022-09-16 NOTE — PATIENT PROFILE ADULT - SAFE PLACE TO LIVE - DETAILS
"my land lady sold the house we rent, it has been hard to find an apartment, we are in a hotel right now."

## 2022-09-16 NOTE — PATIENT PROFILE ADULT - FALL HARM RISK - HARM RISK INTERVENTIONS
Assistance with ambulation/Assistance OOB with selected safe patient handling equipment/Communicate Risk of Fall with Harm to all staff/Discuss with provider need for PT consult/Monitor gait and stability/Provide patient with walking aids - walker, cane, crutches/Reinforce activity limits and safety measures with patient and family/Sit up slowly, dangle for a short time, stand at bedside before walking/Tailored Fall Risk Interventions/Use of alarms - bed, chair and/or voice tab/Visual Cue: Yellow wristband and red socks/Bed in lowest position, wheels locked, appropriate side rails in place/Call bell, personal items and telephone in reach/Instruct patient to call for assistance before getting out of bed or chair/Non-slip footwear when patient is out of bed/[place] to call system/Physically safe environment - no spills, clutter or unnecessary equipment/Purposeful Proactive Rounding/Room/bathroom lighting operational, light cord in reach

## 2022-09-16 NOTE — PROGRESS NOTE ADULT - SUBJECTIVE AND OBJECTIVE BOX
PITER BONDS  [id number]    HPI:                                                                  67 year old male with h/o HTN, HLD, recently seen by PCP and noted to have ECG with ante QW, subsequently referred to cardiologist Dr Mirza and underwent TTE 6/10/22 and noted to have drop in LVEF to 40-45%, plan to undergo further w/u but subsequently admitted with CVA 7/14/22, possible sequale of likely recent MI c/b LV thrombus, started on coumadin, residual left sided weakness but improved, mild speech impairment and left eye vision loss, does report intermittent episodes for chest pain during stressful events, PET/CT with large reversible defect anterior/antelat/septal wall, LVEF rest 37%, stress 36%, now presents for LHC to be performed by Dr Lisa.         Surgery/Hospital Course:  ·  PRE-OP DIAGNOSIS:  CAD (coronary artery disease)   Aortic stenosis  ·  POST-OP DIAGNOSIS:  CAD (coronary artery disease)   Aortic stenosis   ·  PROCEDURES:  CABG, with SIMA 14-Sep-2022   LIMA- LAD, SVG- OM, SVG -ramus   endoscopic vein harvest of the left greater saphenous vein   Replacement, aortic valve, with SIMA 14-Sep-2022   severe AS found on echo intraop   25 mm Inspiris valve used   Today:  No acute events   start coumadin in 2 days   XFER to 4 tower    Surgery/Hospital Course:    Today:  No acute events     ICU Vital Signs Last 24 Hrs  T(C): 36.9 (16 Sep 2022 09:00), Max: 37.4 (16 Sep 2022 00:00)  T(F): 98.5 (16 Sep 2022 09:00), Max: 99.3 (16 Sep 2022 00:00)  HR: 90 (16 Sep 2022 11:20) (79 - 96)  BP: 149/75 (16 Sep 2022 11:20) (102/60 - 149/75)  BP(mean): 107 (16 Sep 2022 11:20) (71 - 107)  ABP: 113/61 (16 Sep 2022 05:00) (97/45 - 139/60)  ABP(mean): 81 (16 Sep 2022 05:00) (62 - 113)  RR: 20 (16 Sep 2022 11:20) (10 - 27)  SpO2: 96% (16 Sep 2022 11:20) (93% - 97%)    O2 Parameters below as of 16 Sep 2022 08:00  Patient On (Oxygen Delivery Method): room air            Physical Exam:  Gen:  Awake, alert   CNS: non focal 	  Neck: no JVD  RES : clear , no wheezing              CVS: Regular  rhythm. Normal S1/S2  Abd: Soft, non-distended. Bowel sounds present.  Skin: No rash.  Ext:  no edema    ============================I/O===========================   I&O's Detail    15 Sep 2022 07:01  -  16 Sep 2022 07:00  --------------------------------------------------------  IN:    Insulin: 34 mL    IV PiggyBack: 150 mL    IV PiggyBack: 500 mL    Milrinone: 54 mL    NiCARdipine: 325 mL    Oral Fluid: 820 mL    sodium chloride 0.9%: 120 mL    sodium chloride 0.9%: 240 mL  Total IN: 2243 mL    OUT:    Chest Tube (mL): 270 mL    Chest Tube (mL): 50 mL    Indwelling Catheter - Urethral (mL): 1050 mL  Total OUT: 1370 mL    Total NET: 873 mL      16 Sep 2022 07:01  -  16 Sep 2022 11:50  --------------------------------------------------------  IN:  Total IN: 0 mL    OUT:    Chest Tube (mL): 0 mL    Chest Tube (mL): 0 mL  Total OUT: 0 mL    Total NET: 0 mL        ============================ LABS =========================                        11.4   18.49 )-----------( 156      ( 16 Sep 2022 02:20 )             32.8     09-16    139  |  104  |  26.3<H>  ----------------------------<  142<H>  4.4   |  24.0  |  0.72    Ca    9.2      16 Sep 2022 02:20  Mg     2.4     09-16    TPro  6.2<L>  /  Alb  4.2  /  TBili  1.7  /  DBili  0.6<H>  /  AST  34  /  ALT  24  /  AlkPhos  42  09-15    LIVER FUNCTIONS - ( 15 Sep 2022 02:01 )  Alb: 4.2 g/dL / Pro: 6.2 g/dL / ALK PHOS: 42 U/L / ALT: 24 U/L / AST: 34 U/L / GGT: x           PT/INR - ( 14 Sep 2022 22:45 )   PT: 16.1 sec;   INR: 1.38 ratio         PTT - ( 14 Sep 2022 22:45 )  PTT:28.6 sec  ABG - ( 15 Sep 2022 03:15 )  pH, Arterial: 7.420 pH, Blood: x     /  pCO2: 39    /  pO2: 106   / HCO3: 25    / Base Excess: 0.8   /  SaO2: 99.2                ======================Micro/Rad/Cardio=================  Culture: Reviewed   CXR: Reviewed  Echo:Reviewed  ======================================================  PAST MEDICAL & SURGICAL HISTORY:  Hypertension      Hyperlipidemia      CVA (cerebrovascular accident)      Intracardiac thrombus      Abnormal nuclear stress test      Cardiomyopathy      No significant past surgical history        ====================ASSESSMENT ==============  68 yo M PMH HTN, HLD, recent MI, CVA 7/14/22 ( left hand weakness , left eye blindness)  found to have LV thrombus (on Coumadin at home), Carotid: WNL prealb 22TSH 2.1 A1C 6.1  9/14 LIMA- LAD, SVG- OM, SVG -ramus, 25 mm Inspiris valve  -CAD (coronary artery disease).   - Hypertension.   - Hyperlipidemia.   -CVA (cerebrovascular accident).   -Severe aortic stenosis.   -Post op Hypovolemia  -Post op respiratory insufficiency       Plan:  - Pain control with oxy ir and tylenol prn  - Encourage DBE/IS, wean NC as tolerated   - Asa therapy for graft patency  - Statin therapy for chronic graft occlusion ppx   - Protonix for GI ppx  - Chemical DVT ppx with lovenox, maintain SCD's for mechanical DVT ppx.  - resume lopressor  -XFER to 4 tower            ====================== NEUROLOGY=====================  acetaminophen     Tablet .. 650 milliGRAM(s) Oral every 6 hours PRN Mild Pain (1 - 3)  melatonin 5 milliGRAM(s) Oral at bedtime  oxyCODONE    IR 5 milliGRAM(s) Oral every 4 hours PRN Moderate Pain (4 - 6)  oxyCODONE    IR 10 milliGRAM(s) Oral every 4 hours PRN Severe Pain (7 - 10)    ==================== RESPIRATORY======================  Post op respiratory insufficiency    ====================CARDIOVASCULAR==================  Post op Hypovolemia  metoprolol tartrate 25 milliGRAM(s) Oral every 12 hours    ===================HEMATOLOGIC/ONC ===================  Monitor H&H/Plts    aspirin enteric coated 81 milliGRAM(s) Oral daily  clopidogrel Tablet 75 milliGRAM(s) Oral daily  enoxaparin Injectable 40 milliGRAM(s) SubCutaneous <User Schedule>    ===================== RENAL =========================  Continue monitoring urine output, I&OS, BUN/Cr     ==================== GASTROINTESTINAL===================  ascorbic acid 500 milliGRAM(s) Oral daily  ferrous    sulfate 325 milliGRAM(s) Oral daily  folic acid 1 milliGRAM(s) Oral daily  pantoprazole    Tablet 40 milliGRAM(s) Oral daily  sodium chloride 0.9% lock flush 3 milliLiter(s) IV Push every 8 hours  sodium chloride 0.9%. 1000 milliLiter(s) (10 mL/Hr) IV Continuous <Continuous>  sodium chloride 0.9%. 1000 milliLiter(s) (5 mL/Hr) IV Continuous <Continuous>    =======================    ENDOCRINE  =====================  dextrose Oral Gel 15 Gram(s) Oral once  glucagon  Injectable 1 milliGRAM(s) IntraMuscular once  insulin glargine Injectable (LANTUS) 12 Unit(s) SubCutaneous at bedtime  insulin lispro (ADMELOG) corrective regimen sliding scale   SubCutaneous Before meals and at bedtime  insulin lispro Injectable (ADMELOG) 3 Unit(s) SubCutaneous three times a day before meals  rosuvastatin 40 milliGRAM(s) Oral at bedtime    ========================INFECTIOUS DISEASE================  vancomycin  IVPB 1500 milliGRAM(s) IV Intermittent every 12 hours          I have spent 35 minutes providing acute care for this critically ill patient     Patient requires continuous monitoring with bedside rhythm monitoring, pulse ox monitoring, and intermittent blood gas analysis. Care plan discussed with ICU care team. Patient remained critical and at risk for life threatening decompensation.

## 2022-09-16 NOTE — PATIENT PROFILE ADULT - FLU SEASON?
Office Progress Note      Preethi Aguirre is a 69 year old male.  Chief Complaint   Patient presents with   • Follow-up     HPI:   69-year-old gentleman presents for follow-up visit prior history of extensive CAD status post 3 stent placements in 2013 last visit I gave him preop clearance for hernia operation.  The surgery was successful no intraoperative cardiac events.  Today presents for follow-up visit denies any chest pain. No new complaints just so recently his PCP was told everything was good.  Last lab work was done in May 2019 usually does it once a year.  Blood pressure has been well controlled.  Occasional bruising when he bumps into things that otherwise no bruising spontaneously.  No shortness of breath reported.     Current Outpatient Medications   Medication Sig Dispense Refill   • lisinopril (ZESTRIL) 20 MG tablet TAKE ONE TABLET BY MOUTH ONE TIME DAILY  30 tablet 2   • clopidogrel (PLAVIX) 75 MG tablet Take 1 tablet by mouth every other day. 16 tablet 8   • metoPROLOL succinate (TOPROL-XL) 25 MG 24 hr tablet TAKE ONE TABLET BY MOUTH ONE TIME DAILY  30 tablet 6   • atorvastatin (LIPITOR) 80 MG tablet TAKE ONE TABLET BY MOUTH AT BEDTIME 90 tablet 3   • hydrochlorothiazide (HYDRODIURIL) 25 MG tablet TAKE ONE TABLET BY MOUTH ONE TIME DAILY  30 tablet 11   • Temazepam 30 MG capsule 30 mg daily.  0   • vitamin D, Cholecalciferol, 1000 units capsule 1,000 Units daily.     • gabapentin (NEURONTIN) 100 MG capsule 2caps at AM and 2caps at PM daily     • aspirin 81 MG tablet daily       No current facility-administered medications for this visit.       Past Medical History:   Diagnosis Date   • CAD (coronary artery disease)    • Dyslipidemia    • Essential hypertension    • Myocardial infarction (CMS/[facility])       Social History:  Social History     Tobacco Use   • Smoking status: Former Smoker     Packs/day: 0.00   • Smokeless tobacco: Never Used   • Tobacco comment: Former tobacco use. >48 used to smoke  but quit   Substance Use Topics   • Alcohol use: Yes     Alcohol/week: 1.0 standard drinks     Types: 1 Cans of beer per week     Frequency: Monthly or less     Drinks per session: 1 or 2     Binge frequency: Never     Comment: 7 cans of beer a week   • Drug use: Not on file     Family History  Family History   Problem Relation Age of Onset   • Coronary Artery Disease Neg Hx         Negative for premature CAD   • Aneurysm Neg Hx         Negative for AAA          REVIEW OF SYSTEMS:   Cara Stahl CMA  12/2/2019 10:40 AM  Sign when Signing Visit  Chest Pain or pressure? Negative  Edema? Negative  Lightheaded or dizziness? Negative  Shortness or breath? Negative  All other 12 systems reviewed and are negative.    EXAM:     Visit Vitals  /56   Pulse 55   Ht 5' 11\" (1.803 m)   Wt 86.2 kg (190 lb)   SpO2 97%   BMI 26.50 kg/m²     GENERAL: well developed, well nourished,in no apparent distress  SKIN: no rashes,no suspicious lesions  HEENT: atraumatic, normocephalic,ears and throat are clear  NECK: supple,no adenopathy,no bruits  LUNGS: clear to auscultation  CARDIO: regular rate and rhythm,nl S1,S2,no murmur  GI: good BS's,no masses, HSM or tenderness  EXTREMITIES: no cyanosis, clubbing or edema  NEURO: no focal deficits  PSYCH:alert and oriented x3       Recent Labs   Lab 05/30/19   CHOLESTEROL 112   HDL 41   TRIGLYCERIDE 100   CALCULATED LDL 51       Recent Labs   Lab 05/30/19   Sodium 138   Chloride 103   BUN 22   Potassium 3.7   Glucose 95   Creatinine 1.28   CALCIUM 9.5       No results for input(s): WBC, RBC, HGB, HCT, MCV, MCHC, RDWCV, PLT, TLYMPH in the last 8765 hours.    Recent Labs   Lab 05/30/19   ALT/SGPT 16             ASSESSMENT AND PLAN:     Problem #1 hyperlipidemia  Currently on atorvastatin 80 mg daily cholesterol is well controlled, LDL goal is 51 May 2019 repeat fasting lipid panel next year in 2020    Problem #2 CAD status post 3 stents in 2013 doing well  Continue Plavix 75 mg every other  day and aspirin 81 mg daily.  Notify our office if the bruising becomes more frequent.     Problem #3 hypertension  Blood pressure borderline low today asymptomatic on hydrochlorothiazide 25 mg daily, metoprolol succinate 25 mg daily, lisinopril 20 mg daily.  If any dizziness asked to notify our office to decrease lisinopril to 10 mg daily.     Problem #4 abnormal EKG  As above doing well asymptomatic underlying history of CAD.             The patient indicates understanding of these issues and agrees to the plan.  The patient is asked to return in 6 months or sooner if any new complaints, thank you.        Edmund Rosenberg MD  12/2/2019  10:46 AM     Yes...

## 2022-09-17 LAB
ANION GAP SERPL CALC-SCNC: 12 MMOL/L — SIGNIFICANT CHANGE UP (ref 5–17)
BASOPHILS # BLD AUTO: 0.02 K/UL — SIGNIFICANT CHANGE UP (ref 0–0.2)
BASOPHILS NFR BLD AUTO: 0.1 % — SIGNIFICANT CHANGE UP (ref 0–2)
BUN SERPL-MCNC: 21.4 MG/DL — HIGH (ref 8–20)
CALCIUM SERPL-MCNC: 9.2 MG/DL — SIGNIFICANT CHANGE UP (ref 8.4–10.5)
CHLORIDE SERPL-SCNC: 99 MMOL/L — SIGNIFICANT CHANGE UP (ref 98–107)
CO2 SERPL-SCNC: 26 MMOL/L — SIGNIFICANT CHANGE UP (ref 22–29)
CREAT SERPL-MCNC: 0.59 MG/DL — SIGNIFICANT CHANGE UP (ref 0.5–1.3)
EGFR: 105 ML/MIN/1.73M2 — SIGNIFICANT CHANGE UP
EOSINOPHIL # BLD AUTO: 0 K/UL — SIGNIFICANT CHANGE UP (ref 0–0.5)
EOSINOPHIL NFR BLD AUTO: 0 % — SIGNIFICANT CHANGE UP (ref 0–6)
GLUCOSE BLDC GLUCOMTR-MCNC: 101 MG/DL — HIGH (ref 70–99)
GLUCOSE BLDC GLUCOMTR-MCNC: 124 MG/DL — HIGH (ref 70–99)
GLUCOSE SERPL-MCNC: 121 MG/DL — HIGH (ref 70–99)
HCT VFR BLD CALC: 35.3 % — LOW (ref 39–50)
HGB BLD-MCNC: 11.8 G/DL — LOW (ref 13–17)
IMM GRANULOCYTES NFR BLD AUTO: 0.8 % — SIGNIFICANT CHANGE UP (ref 0–0.9)
LYMPHOCYTES # BLD AUTO: 0.98 K/UL — LOW (ref 1–3.3)
LYMPHOCYTES # BLD AUTO: 6.8 % — LOW (ref 13–44)
MAGNESIUM SERPL-MCNC: 2 MG/DL — SIGNIFICANT CHANGE UP (ref 1.8–2.6)
MCHC RBC-ENTMCNC: 28.9 PG — SIGNIFICANT CHANGE UP (ref 27–34)
MCHC RBC-ENTMCNC: 33.4 GM/DL — SIGNIFICANT CHANGE UP (ref 32–36)
MCV RBC AUTO: 86.5 FL — SIGNIFICANT CHANGE UP (ref 80–100)
MONOCYTES # BLD AUTO: 0.76 K/UL — SIGNIFICANT CHANGE UP (ref 0–0.9)
MONOCYTES NFR BLD AUTO: 5.3 % — SIGNIFICANT CHANGE UP (ref 2–14)
NEUTROPHILS # BLD AUTO: 12.52 K/UL — HIGH (ref 1.8–7.4)
NEUTROPHILS NFR BLD AUTO: 87 % — HIGH (ref 43–77)
PLATELET # BLD AUTO: 145 K/UL — LOW (ref 150–400)
POTASSIUM SERPL-MCNC: 4.1 MMOL/L — SIGNIFICANT CHANGE UP (ref 3.5–5.3)
POTASSIUM SERPL-SCNC: 4.1 MMOL/L — SIGNIFICANT CHANGE UP (ref 3.5–5.3)
RBC # BLD: 4.08 M/UL — LOW (ref 4.2–5.8)
RBC # FLD: 14.3 % — SIGNIFICANT CHANGE UP (ref 10.3–14.5)
SODIUM SERPL-SCNC: 137 MMOL/L — SIGNIFICANT CHANGE UP (ref 135–145)
WBC # BLD: 14.39 K/UL — HIGH (ref 3.8–10.5)
WBC # FLD AUTO: 14.39 K/UL — HIGH (ref 3.8–10.5)

## 2022-09-17 PROCEDURE — 71045 X-RAY EXAM CHEST 1 VIEW: CPT | Mod: 26

## 2022-09-17 RX ORDER — AMIODARONE HYDROCHLORIDE 400 MG/1
200 TABLET ORAL DAILY
Refills: 0 | Status: CANCELLED | OUTPATIENT
Start: 2022-09-21 | End: 2022-09-19

## 2022-09-17 RX ORDER — METOPROLOL TARTRATE 50 MG
5 TABLET ORAL ONCE
Refills: 0 | Status: COMPLETED | OUTPATIENT
Start: 2022-09-17 | End: 2022-09-17

## 2022-09-17 RX ORDER — AMIODARONE HYDROCHLORIDE 400 MG/1
400 TABLET ORAL EVERY 8 HOURS
Refills: 0 | Status: DISCONTINUED | OUTPATIENT
Start: 2022-09-17 | End: 2022-09-19

## 2022-09-17 RX ORDER — AMIODARONE HYDROCHLORIDE 400 MG/1
TABLET ORAL
Refills: 0 | Status: DISCONTINUED | OUTPATIENT
Start: 2022-09-17 | End: 2022-09-19

## 2022-09-17 RX ADMIN — PANTOPRAZOLE SODIUM 40 MILLIGRAM(S): 20 TABLET, DELAYED RELEASE ORAL at 12:08

## 2022-09-17 RX ADMIN — TAMSULOSIN HYDROCHLORIDE 0.4 MILLIGRAM(S): 0.4 CAPSULE ORAL at 21:19

## 2022-09-17 RX ADMIN — Medication 500 MILLIGRAM(S): at 12:08

## 2022-09-17 RX ADMIN — Medication 81 MILLIGRAM(S): at 12:08

## 2022-09-17 RX ADMIN — CLOPIDOGREL BISULFATE 75 MILLIGRAM(S): 75 TABLET, FILM COATED ORAL at 12:08

## 2022-09-17 RX ADMIN — Medication 50 MILLIGRAM(S): at 05:47

## 2022-09-17 RX ADMIN — Medication 1 MILLIGRAM(S): at 12:09

## 2022-09-17 RX ADMIN — Medication 5 MILLIGRAM(S): at 14:34

## 2022-09-17 RX ADMIN — ENOXAPARIN SODIUM 40 MILLIGRAM(S): 100 INJECTION SUBCUTANEOUS at 12:09

## 2022-09-17 RX ADMIN — AMIODARONE HYDROCHLORIDE 400 MILLIGRAM(S): 400 TABLET ORAL at 18:35

## 2022-09-17 RX ADMIN — SODIUM CHLORIDE 3 MILLILITER(S): 9 INJECTION INTRAMUSCULAR; INTRAVENOUS; SUBCUTANEOUS at 05:47

## 2022-09-17 RX ADMIN — SODIUM CHLORIDE 3 MILLILITER(S): 9 INJECTION INTRAMUSCULAR; INTRAVENOUS; SUBCUTANEOUS at 21:20

## 2022-09-17 RX ADMIN — Medication 650 MILLIGRAM(S): at 12:09

## 2022-09-17 RX ADMIN — AMIODARONE HYDROCHLORIDE 400 MILLIGRAM(S): 400 TABLET ORAL at 21:18

## 2022-09-17 RX ADMIN — Medication 325 MILLIGRAM(S): at 12:08

## 2022-09-17 RX ADMIN — Medication 5 MILLIGRAM(S): at 21:19

## 2022-09-17 RX ADMIN — SODIUM CHLORIDE 3 MILLILITER(S): 9 INJECTION INTRAMUSCULAR; INTRAVENOUS; SUBCUTANEOUS at 12:12

## 2022-09-17 RX ADMIN — ROSUVASTATIN CALCIUM 40 MILLIGRAM(S): 5 TABLET ORAL at 21:18

## 2022-09-17 NOTE — PROGRESS NOTE ADULT - SUBJECTIVE AND OBJECTIVE BOX
Subjective - patient seen and evaluated bedside. Sitting comfortably in bed. States "I feel terrible. I feel like I'm not making any progress. However, denies CP, SOB, HA, dizziness, n/v/d    Review of Systems: negative x 10 systems except as noted above    Brief summary:  67yMale POD# 3 CABGx3 /AVR . postop course uneventful    Significant/[id number] events: downgraded to floor from CTICU      PAST MEDICAL & SURGICAL HISTORY:  Hypertension      Hyperlipidemia      CVA (cerebrovascular accident)      Intracardiac thrombus      Abnormal nuclear stress test      Cardiomyopathy      No significant past surgical history      No significant past surgical history            acetaminophen     Tablet .. 650 milliGRAM(s) Oral every 6 hours PRN  ascorbic acid 500 milliGRAM(s) Oral daily  aspirin enteric coated 81 milliGRAM(s) Oral daily  clopidogrel Tablet 75 milliGRAM(s) Oral daily  dextrose Oral Gel 15 Gram(s) Oral once  enoxaparin Injectable 40 milliGRAM(s) SubCutaneous <User Schedule>  ferrous    sulfate 325 milliGRAM(s) Oral daily  folic acid 1 milliGRAM(s) Oral daily  glucagon  Injectable 1 milliGRAM(s) IntraMuscular once  influenza  Vaccine (HIGH DOSE) 0.7 milliLiter(s) IntraMuscular once  insulin lispro (ADMELOG) corrective regimen sliding scale   SubCutaneous Before meals and at bedtime  melatonin 5 milliGRAM(s) Oral at bedtime  metoprolol tartrate 50 milliGRAM(s) Oral every 12 hours  oxyCODONE    IR 5 milliGRAM(s) Oral every 4 hours PRN  oxyCODONE    IR 10 milliGRAM(s) Oral every 4 hours PRN  pantoprazole    Tablet 40 milliGRAM(s) Oral daily  rosuvastatin 40 milliGRAM(s) Oral at bedtime  sodium chloride 0.9% lock flush 3 milliLiter(s) IV Push every 8 hours  sodium chloride 0.9%. 1000 milliLiter(s) IV Continuous <Continuous>  sodium chloride 0.9%. 1000 milliLiter(s) IV Continuous <Continuous>  tamsulosin 0.4 milliGRAM(s) Oral at bedtime  MEDICATIONS  (PRN):  acetaminophen     Tablet .. 650 milliGRAM(s) Oral every 6 hours PRN Mild Pain (1 - 3)  oxyCODONE    IR 5 milliGRAM(s) Oral every 4 hours PRN Moderate Pain (4 - 6)  oxyCODONE    IR 10 milliGRAM(s) Oral every 4 hours PRN Severe Pain (7 - 10)      Daily     Daily Weight in k.1 (16 Sep 2022 04:34)      ABG - ( 15 Sep 2022 03:15 )  pH, Arterial: 7.420 pH, Blood: x     /  pCO2: 39    /  pO2: 106   / HCO3: 25    / Base Excess: 0.8   /  SaO2: 99.2                                    11.4   18.49 )-----------( 156      ( 16 Sep 2022 02:20 )             32.8       139  |  104  |  26.3<H>  ----------------------------<  142<H>  4.4   |  24.0  |  0.72    Ca    9.2      16 Sep 2022 02:20  Mg     2.4         TPro  6.2<L>  /  Alb  4.2  /  TBili  1.7  /  DBili  0.6<H>  /  AST  34  /  ALT  24  /  AlkPhos  42  0915    CARDIAC MARKERS ( 15 Sep 2022 02:01 )  x     / 0.19 ng/mL / 186 U/L / x     / 10.0 ng/mL          Objective:  T(C): 36.6 (22 @ 20:50), Max: 37.6 (22 @ 16:28)  HR: 88 (22 @ 20:50) (82 - 94)  BP: 139/80 (22 @ 20:50) (119/61 - 167/92)  RR: 18 (22 @ 20:50) (17 - 27)  SpO2: 99% (22 @ 20:50) (93% - 99%)  Wt(kg): --CAPILLARY BLOOD GLUCOSE      POCT Blood Glucose.: 148 mg/dL (16 Sep 2022 21:09)  POCT Blood Glucose.: 134 mg/dL (16 Sep 2022 17:32)  POCT Blood Glucose.: 122 mg/dL (16 Sep 2022 12:09)  POCT Blood Glucose.: 122 mg/dL (16 Sep 2022 07:17)  I&O's Summary    15 Sep 2022 07:01  -  16 Sep 2022 07:00  --------------------------------------------------------  IN: 2243 mL / OUT: 1370 mL / NET: 873 mL    16 Sep 2022 07:01  -  17 Sep 2022 00:42  --------------------------------------------------------  IN: 350 mL / OUT: 350 mL / NET: 0 mL        Physical Exam  General: NAD  Neuro: A+O x 3, non-focal, speech clear and intact  Psych: Appropriate affect  HEENT:  NCAT, No conjuctival edema or icterus, no thrush.  Pulm: CTA, equal bilaterally  CV: RRR, +S1S2  Abd: soft, NT, ND, +BS  Ext: +DP Pulses b/l, 2+ nonpitting LE edema  Skin: Warm, dry, intact  Inc: MSI C/D/I/stable w/ dressing, LE vein harvest site C/D/I with Ace wrap  Chest tubes: mediastinal and LT pleural CT to suction, draining appropriately, no AL.         Imaging:  < from: Xray Chest 1 View- PORTABLE-Routine (Xray Chest 1 View- PORTABLE-Routine in AM.) (22 @ 10:12) >      INTERPRETATION:  Clinical history: 67-year-old male, preop open heart   surgery.    Portable view of the chest is compared to 2011.    FINDINGS: Normal cardiac silhouette and normal pulmonary vasculature with   no consolidation, effusion, pneumothorax or acute osseous finding.    IMPRESSION:  No acute radiographic findings    < end of copied text >

## 2022-09-18 DIAGNOSIS — I48.91 UNSPECIFIED ATRIAL FIBRILLATION: ICD-10-CM

## 2022-09-18 LAB
ALBUMIN SERPL ELPH-MCNC: 3.9 G/DL — SIGNIFICANT CHANGE UP (ref 3.3–5.2)
ALP SERPL-CCNC: 42 U/L — SIGNIFICANT CHANGE UP (ref 40–120)
ALT FLD-CCNC: 22 U/L — SIGNIFICANT CHANGE UP
ANION GAP SERPL CALC-SCNC: 11 MMOL/L — SIGNIFICANT CHANGE UP (ref 5–17)
APTT BLD: 27.5 SEC — SIGNIFICANT CHANGE UP (ref 27.5–35.5)
AST SERPL-CCNC: 18 U/L — SIGNIFICANT CHANGE UP
BILIRUB DIRECT SERPL-MCNC: 0.2 MG/DL — SIGNIFICANT CHANGE UP (ref 0–0.3)
BILIRUB INDIRECT FLD-MCNC: 0.6 MG/DL — SIGNIFICANT CHANGE UP (ref 0.2–1)
BILIRUB SERPL-MCNC: 0.8 MG/DL — SIGNIFICANT CHANGE UP (ref 0.4–2)
BUN SERPL-MCNC: 18.7 MG/DL — SIGNIFICANT CHANGE UP (ref 8–20)
CALCIUM SERPL-MCNC: 9.2 MG/DL — SIGNIFICANT CHANGE UP (ref 8.4–10.5)
CHLORIDE SERPL-SCNC: 100 MMOL/L — SIGNIFICANT CHANGE UP (ref 98–107)
CO2 SERPL-SCNC: 27 MMOL/L — SIGNIFICANT CHANGE UP (ref 22–29)
CREAT SERPL-MCNC: 0.56 MG/DL — SIGNIFICANT CHANGE UP (ref 0.5–1.3)
EGFR: 107 ML/MIN/1.73M2 — SIGNIFICANT CHANGE UP
GLUCOSE SERPL-MCNC: 112 MG/DL — HIGH (ref 70–99)
HCT VFR BLD CALC: 33.8 % — LOW (ref 39–50)
HGB BLD-MCNC: 11.3 G/DL — LOW (ref 13–17)
INR BLD: 1.23 RATIO — HIGH (ref 0.88–1.16)
MAGNESIUM SERPL-MCNC: 2 MG/DL — SIGNIFICANT CHANGE UP (ref 1.6–2.6)
MCHC RBC-ENTMCNC: 28.8 PG — SIGNIFICANT CHANGE UP (ref 27–34)
MCHC RBC-ENTMCNC: 33.4 GM/DL — SIGNIFICANT CHANGE UP (ref 32–36)
MCV RBC AUTO: 86 FL — SIGNIFICANT CHANGE UP (ref 80–100)
PLATELET # BLD AUTO: 138 K/UL — LOW (ref 150–400)
POTASSIUM SERPL-MCNC: 3.9 MMOL/L — SIGNIFICANT CHANGE UP (ref 3.5–5.3)
POTASSIUM SERPL-SCNC: 3.9 MMOL/L — SIGNIFICANT CHANGE UP (ref 3.5–5.3)
PROT SERPL-MCNC: 6.5 G/DL — LOW (ref 6.6–8.7)
PROTHROM AB SERPL-ACNC: 14.3 SEC — HIGH (ref 10.5–13.4)
RBC # BLD: 3.93 M/UL — LOW (ref 4.2–5.8)
RBC # FLD: 14.2 % — SIGNIFICANT CHANGE UP (ref 10.3–14.5)
SODIUM SERPL-SCNC: 138 MMOL/L — SIGNIFICANT CHANGE UP (ref 135–145)
WBC # BLD: 10.17 K/UL — SIGNIFICANT CHANGE UP (ref 3.8–10.5)
WBC # FLD AUTO: 10.17 K/UL — SIGNIFICANT CHANGE UP (ref 3.8–10.5)

## 2022-09-18 PROCEDURE — 99024 POSTOP FOLLOW-UP VISIT: CPT

## 2022-09-18 PROCEDURE — 71045 X-RAY EXAM CHEST 1 VIEW: CPT | Mod: 26,77

## 2022-09-18 PROCEDURE — 71045 X-RAY EXAM CHEST 1 VIEW: CPT | Mod: 26

## 2022-09-18 PROCEDURE — 93010 ELECTROCARDIOGRAM REPORT: CPT

## 2022-09-18 RX ORDER — POTASSIUM CHLORIDE 20 MEQ
40 PACKET (EA) ORAL ONCE
Refills: 0 | Status: COMPLETED | OUTPATIENT
Start: 2022-09-18 | End: 2022-09-18

## 2022-09-18 RX ORDER — ACETAMINOPHEN 500 MG
975 TABLET ORAL EVERY 6 HOURS
Refills: 0 | Status: DISCONTINUED | OUTPATIENT
Start: 2022-09-18 | End: 2022-09-19

## 2022-09-18 RX ORDER — WARFARIN SODIUM 2.5 MG/1
7.5 TABLET ORAL ONCE
Refills: 0 | Status: COMPLETED | OUTPATIENT
Start: 2022-09-18 | End: 2022-09-18

## 2022-09-18 RX ADMIN — AMIODARONE HYDROCHLORIDE 400 MILLIGRAM(S): 400 TABLET ORAL at 05:00

## 2022-09-18 RX ADMIN — Medication 1 MILLIGRAM(S): at 09:26

## 2022-09-18 RX ADMIN — Medication 81 MILLIGRAM(S): at 09:26

## 2022-09-18 RX ADMIN — Medication 650 MILLIGRAM(S): at 22:09

## 2022-09-18 RX ADMIN — AMIODARONE HYDROCHLORIDE 400 MILLIGRAM(S): 400 TABLET ORAL at 15:19

## 2022-09-18 RX ADMIN — Medication 325 MILLIGRAM(S): at 09:26

## 2022-09-18 RX ADMIN — WARFARIN SODIUM 7.5 MILLIGRAM(S): 2.5 TABLET ORAL at 22:12

## 2022-09-18 RX ADMIN — Medication 500 MILLIGRAM(S): at 09:27

## 2022-09-18 RX ADMIN — Medication 50 MILLIGRAM(S): at 17:27

## 2022-09-18 RX ADMIN — Medication 40 MILLIEQUIVALENT(S): at 09:26

## 2022-09-18 RX ADMIN — ROSUVASTATIN CALCIUM 40 MILLIGRAM(S): 5 TABLET ORAL at 22:09

## 2022-09-18 RX ADMIN — CLOPIDOGREL BISULFATE 75 MILLIGRAM(S): 75 TABLET, FILM COATED ORAL at 09:26

## 2022-09-18 RX ADMIN — AMIODARONE HYDROCHLORIDE 400 MILLIGRAM(S): 400 TABLET ORAL at 22:09

## 2022-09-18 RX ADMIN — TAMSULOSIN HYDROCHLORIDE 0.4 MILLIGRAM(S): 0.4 CAPSULE ORAL at 22:10

## 2022-09-18 RX ADMIN — Medication 50 MILLIGRAM(S): at 05:01

## 2022-09-18 RX ADMIN — SODIUM CHLORIDE 3 MILLILITER(S): 9 INJECTION INTRAMUSCULAR; INTRAVENOUS; SUBCUTANEOUS at 21:58

## 2022-09-18 RX ADMIN — PANTOPRAZOLE SODIUM 40 MILLIGRAM(S): 20 TABLET, DELAYED RELEASE ORAL at 09:31

## 2022-09-18 RX ADMIN — Medication 650 MILLIGRAM(S): at 22:38

## 2022-09-18 RX ADMIN — SODIUM CHLORIDE 3 MILLILITER(S): 9 INJECTION INTRAMUSCULAR; INTRAVENOUS; SUBCUTANEOUS at 15:16

## 2022-09-18 RX ADMIN — SODIUM CHLORIDE 3 MILLILITER(S): 9 INJECTION INTRAMUSCULAR; INTRAVENOUS; SUBCUTANEOUS at 05:00

## 2022-09-18 NOTE — PROGRESS NOTE ADULT - SUBJECTIVE AND OBJECTIVE BOX
67y Male s/p CABG x4, AVRt POD#4    Subjective: Feeling a little better than yesterday.  Had some loose stool today.  Tolerating PO, ambulating. Oxygenating well on ambient air.  Went into a-fib with RVR yesterday, received IV Lopressor, now mostly rate coltrolled in 90's-100's.  Oral miodarone load initiated.  enies chest pain/SOB/cough/weakness.      T(C): 36.9 (09-17-22 @ 21:15), Max: 37.1 (09-17-22 @ 08:06)  HR: 106 (09-18-22 @ 00:46) (77 - 118)  BP: 126/86 (09-18-22 @ 00:46) (106/73 - 178/97)  ABP: --  ABP(mean): --  RR: 18 (09-18-22 @ 00:46) (18 - 20)  SpO2: 95% (09-18-22 @ 00:46) (95% - 100%)  Wt(kg): --  CVP(mm Hg): --  CO: --  CI: --  PA: --         09-17    137  |  99  |  21.4<H>  ----------------------------<  121<H>  4.1   |  26.0  |  0.59    Ca    9.2      17 Sep 2022 06:55  Mg     2.0     09-17                                 11.8   14.39 )-----------( 145      ( 17 Sep 2022 06:55 )             35.3                     CAPILLARY BLOOD GLUCOSE      POCT Blood Glucose.: 124 mg/dL (17 Sep 2022 12:11)  POCT Blood Glucose.: 101 mg/dL (17 Sep 2022 08:16)           CXR:    I&O's Detail    16 Sep 2022 07:01  -  17 Sep 2022 07:00  --------------------------------------------------------  IN:    Oral Fluid: 950 mL  Total IN: 950 mL    OUT:    Chest Tube (mL): 0 mL    Chest Tube (mL): 120 mL    Voided (mL): 1870 mL  Total OUT: 1990 mL    Total NET: -1040 mL      17 Sep 2022 07:01  -  18 Sep 2022 01:00  --------------------------------------------------------  IN:  Total IN: 0 mL    OUT:    Chest Tube (mL): 0 mL    Chest Tube (mL): 0 mL  Total OUT: 0 mL    Total NET: 0 mL          MEDICATIONS  (STANDING):  aMIOdarone    Tablet   Oral   aMIOdarone    Tablet 400 milliGRAM(s) Oral every 8 hours  ascorbic acid 500 milliGRAM(s) Oral daily  aspirin enteric coated 81 milliGRAM(s) Oral daily  clopidogrel Tablet 75 milliGRAM(s) Oral daily  enoxaparin Injectable 40 milliGRAM(s) SubCutaneous <User Schedule>  ferrous    sulfate 325 milliGRAM(s) Oral daily  folic acid 1 milliGRAM(s) Oral daily  influenza  Vaccine (HIGH DOSE) 0.7 milliLiter(s) IntraMuscular once  melatonin 5 milliGRAM(s) Oral at bedtime  metoprolol tartrate 50 milliGRAM(s) Oral every 12 hours  pantoprazole    Tablet 40 milliGRAM(s) Oral daily  rosuvastatin 40 milliGRAM(s) Oral at bedtime  sodium chloride 0.9% lock flush 3 milliLiter(s) IV Push every 8 hours  tamsulosin 0.4 milliGRAM(s) Oral at bedtime    MEDICATIONS  (PRN):  acetaminophen     Tablet .. 650 milliGRAM(s) Oral every 6 hours PRN Mild Pain (1 - 3)  oxyCODONE    IR 5 milliGRAM(s) Oral every 4 hours PRN Moderate Pain (4 - 6)  oxyCODONE    IR 10 milliGRAM(s) Oral every 4 hours PRN Severe Pain (7 - 10)      Physical Exam  Neuro: A+O x 3, non-focal, speech clear and intact  Pulm: CTA, equal bilaterally  CV: Irreg Irreg, tachy at times +S1S2  Abd: soft, NT, ND, +BS  Ext: 1+ edema  Inc: MSI C/D/I/stable w/ Prevena dsg, LLE C/D/I w/ dsg/Ace wrap    -----------------------------------------------------------------------------------------------------------------------------------------------------------------------------  -----------------------------------------------------------------------------------------------------------------------------------------------------------------------------

## 2022-09-19 ENCOUNTER — TRANSCRIPTION ENCOUNTER (OUTPATIENT)
Age: 67
End: 2022-09-19

## 2022-09-19 VITALS
OXYGEN SATURATION: 97 % | HEART RATE: 80 BPM | SYSTOLIC BLOOD PRESSURE: 130 MMHG | DIASTOLIC BLOOD PRESSURE: 83 MMHG | RESPIRATION RATE: 18 BRPM | TEMPERATURE: 98 F

## 2022-09-19 DIAGNOSIS — I51.3 INTRACARDIAC THROMBOSIS, NOT ELSEWHERE CLASSIFIED: ICD-10-CM

## 2022-09-19 LAB
ALBUMIN SERPL ELPH-MCNC: 3.6 G/DL — SIGNIFICANT CHANGE UP (ref 3.3–5.2)
ALP SERPL-CCNC: 44 U/L — SIGNIFICANT CHANGE UP (ref 40–120)
ALT FLD-CCNC: 30 U/L — SIGNIFICANT CHANGE UP
ANION GAP SERPL CALC-SCNC: 10 MMOL/L — SIGNIFICANT CHANGE UP (ref 5–17)
APTT BLD: 30.6 SEC — SIGNIFICANT CHANGE UP (ref 27.5–35.5)
AST SERPL-CCNC: 21 U/L — SIGNIFICANT CHANGE UP
BASOPHILS # BLD AUTO: 0.05 K/UL — SIGNIFICANT CHANGE UP (ref 0–0.2)
BASOPHILS NFR BLD AUTO: 0.6 % — SIGNIFICANT CHANGE UP (ref 0–2)
BILIRUB SERPL-MCNC: 0.8 MG/DL — SIGNIFICANT CHANGE UP (ref 0.4–2)
BUN SERPL-MCNC: 19.5 MG/DL — SIGNIFICANT CHANGE UP (ref 8–20)
CALCIUM SERPL-MCNC: 9.1 MG/DL — SIGNIFICANT CHANGE UP (ref 8.4–10.5)
CHLORIDE SERPL-SCNC: 103 MMOL/L — SIGNIFICANT CHANGE UP (ref 98–107)
CO2 SERPL-SCNC: 26 MMOL/L — SIGNIFICANT CHANGE UP (ref 22–29)
CREAT SERPL-MCNC: 0.67 MG/DL — SIGNIFICANT CHANGE UP (ref 0.5–1.3)
EGFR: 101 ML/MIN/1.73M2 — SIGNIFICANT CHANGE UP
EOSINOPHIL # BLD AUTO: 0.07 K/UL — SIGNIFICANT CHANGE UP (ref 0–0.5)
EOSINOPHIL NFR BLD AUTO: 0.9 % — SIGNIFICANT CHANGE UP (ref 0–6)
GLUCOSE SERPL-MCNC: 105 MG/DL — HIGH (ref 70–99)
HCT VFR BLD CALC: 35 % — LOW (ref 39–50)
HGB BLD-MCNC: 11.7 G/DL — LOW (ref 13–17)
IMM GRANULOCYTES NFR BLD AUTO: 2 % — HIGH (ref 0–0.9)
INR BLD: 1.16 RATIO — SIGNIFICANT CHANGE UP (ref 0.88–1.16)
LYMPHOCYTES # BLD AUTO: 1.47 K/UL — SIGNIFICANT CHANGE UP (ref 1–3.3)
LYMPHOCYTES # BLD AUTO: 18.1 % — SIGNIFICANT CHANGE UP (ref 13–44)
MAGNESIUM SERPL-MCNC: 2 MG/DL — SIGNIFICANT CHANGE UP (ref 1.8–2.6)
MCHC RBC-ENTMCNC: 28.7 PG — SIGNIFICANT CHANGE UP (ref 27–34)
MCHC RBC-ENTMCNC: 33.4 GM/DL — SIGNIFICANT CHANGE UP (ref 32–36)
MCV RBC AUTO: 86 FL — SIGNIFICANT CHANGE UP (ref 80–100)
MONOCYTES # BLD AUTO: 0.54 K/UL — SIGNIFICANT CHANGE UP (ref 0–0.9)
MONOCYTES NFR BLD AUTO: 6.7 % — SIGNIFICANT CHANGE UP (ref 2–14)
NEUTROPHILS # BLD AUTO: 5.83 K/UL — SIGNIFICANT CHANGE UP (ref 1.8–7.4)
NEUTROPHILS NFR BLD AUTO: 71.7 % — SIGNIFICANT CHANGE UP (ref 43–77)
PLATELET # BLD AUTO: 162 K/UL — SIGNIFICANT CHANGE UP (ref 150–400)
POTASSIUM SERPL-MCNC: 4 MMOL/L — SIGNIFICANT CHANGE UP (ref 3.5–5.3)
POTASSIUM SERPL-SCNC: 4 MMOL/L — SIGNIFICANT CHANGE UP (ref 3.5–5.3)
PROT SERPL-MCNC: 6.3 G/DL — LOW (ref 6.6–8.7)
PROTHROM AB SERPL-ACNC: 13.5 SEC — HIGH (ref 10.5–13.4)
RBC # BLD: 4.07 M/UL — LOW (ref 4.2–5.8)
RBC # FLD: 14.3 % — SIGNIFICANT CHANGE UP (ref 10.3–14.5)
SODIUM SERPL-SCNC: 139 MMOL/L — SIGNIFICANT CHANGE UP (ref 135–145)
WBC # BLD: 8.12 K/UL — SIGNIFICANT CHANGE UP (ref 3.8–10.5)
WBC # FLD AUTO: 8.12 K/UL — SIGNIFICANT CHANGE UP (ref 3.8–10.5)

## 2022-09-19 PROCEDURE — 93005 ELECTROCARDIOGRAM TRACING: CPT

## 2022-09-19 PROCEDURE — 86850 RBC ANTIBODY SCREEN: CPT

## 2022-09-19 PROCEDURE — 81003 URINALYSIS AUTO W/O SCOPE: CPT

## 2022-09-19 PROCEDURE — 84295 ASSAY OF SERUM SODIUM: CPT

## 2022-09-19 PROCEDURE — 82435 ASSAY OF BLOOD CHLORIDE: CPT

## 2022-09-19 PROCEDURE — C1894: CPT

## 2022-09-19 PROCEDURE — 84481 FREE ASSAY (FT-3): CPT

## 2022-09-19 PROCEDURE — 94002 VENT MGMT INPAT INIT DAY: CPT

## 2022-09-19 PROCEDURE — P9037: CPT

## 2022-09-19 PROCEDURE — 82550 ASSAY OF CK (CPK): CPT

## 2022-09-19 PROCEDURE — G1004: CPT

## 2022-09-19 PROCEDURE — 70450 CT HEAD/BRAIN W/O DYE: CPT | Mod: MG

## 2022-09-19 PROCEDURE — 94760 N-INVAS EAR/PLS OXIMETRY 1: CPT

## 2022-09-19 PROCEDURE — 83880 ASSAY OF NATRIURETIC PEPTIDE: CPT

## 2022-09-19 PROCEDURE — 82947 ASSAY GLUCOSE BLOOD QUANT: CPT

## 2022-09-19 PROCEDURE — 83036 HEMOGLOBIN GLYCOSYLATED A1C: CPT

## 2022-09-19 PROCEDURE — C1887: CPT

## 2022-09-19 PROCEDURE — 86901 BLOOD TYPING SEROLOGIC RH(D): CPT

## 2022-09-19 PROCEDURE — 93010 ELECTROCARDIOGRAM REPORT: CPT

## 2022-09-19 PROCEDURE — 82553 CREATINE MB FRACTION: CPT

## 2022-09-19 PROCEDURE — 84439 ASSAY OF FREE THYROXINE: CPT

## 2022-09-19 PROCEDURE — 84132 ASSAY OF SERUM POTASSIUM: CPT

## 2022-09-19 PROCEDURE — 82962 GLUCOSE BLOOD TEST: CPT

## 2022-09-19 PROCEDURE — P9012: CPT

## 2022-09-19 PROCEDURE — 85576 BLOOD PLATELET AGGREGATION: CPT

## 2022-09-19 PROCEDURE — 36430 TRANSFUSION BLD/BLD COMPNT: CPT

## 2022-09-19 PROCEDURE — 84100 ASSAY OF PHOSPHORUS: CPT

## 2022-09-19 PROCEDURE — P9100: CPT

## 2022-09-19 PROCEDURE — 85610 PROTHROMBIN TIME: CPT

## 2022-09-19 PROCEDURE — 99024 POSTOP FOLLOW-UP VISIT: CPT

## 2022-09-19 PROCEDURE — 86923 COMPATIBILITY TEST ELECTRIC: CPT

## 2022-09-19 PROCEDURE — 80053 COMPREHEN METABOLIC PANEL: CPT

## 2022-09-19 PROCEDURE — 88311 DECALCIFY TISSUE: CPT

## 2022-09-19 PROCEDURE — 85025 COMPLETE CBC W/AUTO DIFF WBC: CPT

## 2022-09-19 PROCEDURE — 85730 THROMBOPLASTIN TIME PARTIAL: CPT

## 2022-09-19 PROCEDURE — P9016: CPT

## 2022-09-19 PROCEDURE — 85027 COMPLETE CBC AUTOMATED: CPT

## 2022-09-19 PROCEDURE — 84484 ASSAY OF TROPONIN QUANT: CPT

## 2022-09-19 PROCEDURE — 83735 ASSAY OF MAGNESIUM: CPT

## 2022-09-19 PROCEDURE — 85018 HEMOGLOBIN: CPT

## 2022-09-19 PROCEDURE — 93454 CORONARY ARTERY ANGIO S&I: CPT

## 2022-09-19 PROCEDURE — 85014 HEMATOCRIT: CPT

## 2022-09-19 PROCEDURE — 80076 HEPATIC FUNCTION PANEL: CPT

## 2022-09-19 PROCEDURE — 83605 ASSAY OF LACTIC ACID: CPT

## 2022-09-19 PROCEDURE — U0005: CPT

## 2022-09-19 PROCEDURE — 93320 DOPPLER ECHO COMPLETE: CPT

## 2022-09-19 PROCEDURE — 87640 STAPH A DNA AMP PROBE: CPT

## 2022-09-19 PROCEDURE — P9047: CPT

## 2022-09-19 PROCEDURE — 88305 TISSUE EXAM BY PATHOLOGIST: CPT

## 2022-09-19 PROCEDURE — C1769: CPT

## 2022-09-19 PROCEDURE — 87641 MR-STAPH DNA AMP PROBE: CPT

## 2022-09-19 PROCEDURE — 84134 ASSAY OF PREALBUMIN: CPT

## 2022-09-19 PROCEDURE — 86965 POOLING BLOOD PLATELETS: CPT

## 2022-09-19 PROCEDURE — 82803 BLOOD GASES ANY COMBINATION: CPT

## 2022-09-19 PROCEDURE — 80048 BASIC METABOLIC PNL TOTAL CA: CPT

## 2022-09-19 PROCEDURE — 93312 ECHO TRANSESOPHAGEAL: CPT

## 2022-09-19 PROCEDURE — 80061 LIPID PANEL: CPT

## 2022-09-19 PROCEDURE — 84443 ASSAY THYROID STIM HORMONE: CPT

## 2022-09-19 PROCEDURE — P9045: CPT

## 2022-09-19 PROCEDURE — U0003: CPT

## 2022-09-19 PROCEDURE — 36415 COLL VENOUS BLD VENIPUNCTURE: CPT

## 2022-09-19 PROCEDURE — 71045 X-RAY EXAM CHEST 1 VIEW: CPT

## 2022-09-19 PROCEDURE — 93325 DOPPLER ECHO COLOR FLOW MAPG: CPT

## 2022-09-19 PROCEDURE — 71045 X-RAY EXAM CHEST 1 VIEW: CPT | Mod: 26

## 2022-09-19 PROCEDURE — 86900 BLOOD TYPING SEROLOGIC ABO: CPT

## 2022-09-19 PROCEDURE — 93880 EXTRACRANIAL BILAT STUDY: CPT

## 2022-09-19 PROCEDURE — C1889: CPT

## 2022-09-19 PROCEDURE — 82330 ASSAY OF CALCIUM: CPT

## 2022-09-19 PROCEDURE — C8929: CPT

## 2022-09-19 PROCEDURE — 94010 BREATHING CAPACITY TEST: CPT

## 2022-09-19 PROCEDURE — 97163 PT EVAL HIGH COMPLEX 45 MIN: CPT

## 2022-09-19 RX ORDER — SACUBITRIL AND VALSARTAN 24; 26 MG/1; MG/1
1 TABLET, FILM COATED ORAL
Qty: 0 | Refills: 0 | DISCHARGE

## 2022-09-19 RX ORDER — WARFARIN SODIUM 2.5 MG/1
7.5 TABLET ORAL ONCE
Refills: 0 | Status: DISCONTINUED | OUTPATIENT
Start: 2022-09-19 | End: 2022-09-19

## 2022-09-19 RX ORDER — TAMSULOSIN HYDROCHLORIDE 0.4 MG/1
1 CAPSULE ORAL
Qty: 30 | Refills: 1
Start: 2022-09-19 | End: 2022-11-17

## 2022-09-19 RX ORDER — WARFARIN SODIUM 2.5 MG/1
1 TABLET ORAL
Qty: 0 | Refills: 0 | DISCHARGE

## 2022-09-19 RX ORDER — METOPROLOL TARTRATE 50 MG
1 TABLET ORAL
Qty: 60 | Refills: 1
Start: 2022-09-19 | End: 2022-11-17

## 2022-09-19 RX ORDER — WARFARIN SODIUM 2.5 MG/1
1 TABLET ORAL
Qty: 30 | Refills: 1
Start: 2022-09-19 | End: 2022-11-17

## 2022-09-19 RX ORDER — FERROUS SULFATE 325(65) MG
1 TABLET ORAL
Qty: 30 | Refills: 0
Start: 2022-09-19 | End: 2022-10-18

## 2022-09-19 RX ORDER — ENOXAPARIN SODIUM 100 MG/ML
0 INJECTION SUBCUTANEOUS
Qty: 0 | Refills: 0 | DISCHARGE

## 2022-09-19 RX ORDER — AMIODARONE HYDROCHLORIDE 400 MG/1
1 TABLET ORAL
Qty: 30 | Refills: 1
Start: 2022-09-19 | End: 2022-11-17

## 2022-09-19 RX ORDER — ASCORBIC ACID 60 MG
1 TABLET,CHEWABLE ORAL
Qty: 30 | Refills: 0
Start: 2022-09-19 | End: 2022-10-18

## 2022-09-19 RX ORDER — ACETAMINOPHEN 500 MG
2 TABLET ORAL
Qty: 0 | Refills: 0 | DISCHARGE
Start: 2022-09-19

## 2022-09-19 RX ORDER — FOLIC ACID 0.8 MG
1 TABLET ORAL
Qty: 30 | Refills: 0
Start: 2022-09-19 | End: 2022-10-18

## 2022-09-19 RX ORDER — SPIRONOLACTONE 25 MG/1
12.5 TABLET, FILM COATED ORAL
Qty: 0 | Refills: 0 | DISCHARGE

## 2022-09-19 RX ORDER — METOPROLOL TARTRATE 50 MG
1 TABLET ORAL
Qty: 0 | Refills: 0 | DISCHARGE

## 2022-09-19 RX ADMIN — Medication 325 MILLIGRAM(S): at 12:47

## 2022-09-19 RX ADMIN — Medication 500 MILLIGRAM(S): at 12:47

## 2022-09-19 RX ADMIN — PANTOPRAZOLE SODIUM 40 MILLIGRAM(S): 20 TABLET, DELAYED RELEASE ORAL at 12:48

## 2022-09-19 RX ADMIN — SODIUM CHLORIDE 3 MILLILITER(S): 9 INJECTION INTRAMUSCULAR; INTRAVENOUS; SUBCUTANEOUS at 05:28

## 2022-09-19 RX ADMIN — Medication 1 MILLIGRAM(S): at 12:47

## 2022-09-19 RX ADMIN — AMIODARONE HYDROCHLORIDE 400 MILLIGRAM(S): 400 TABLET ORAL at 05:20

## 2022-09-19 RX ADMIN — Medication 81 MILLIGRAM(S): at 12:47

## 2022-09-19 RX ADMIN — Medication 50 MILLIGRAM(S): at 05:20

## 2022-09-19 NOTE — DISCHARGE NOTE PROVIDER - NSDCFUADDINST_GEN_ALL_CORE_FT
Please call the Cardiothoracic Surgery office at [phone] if you are experiencing any shortness of breath, chest pain, fevers or chills, drainage from the incisions, persistent nausea, vomiting or if you have any questions about your medications. If the symptoms are severe, call 911 and go to the nearest hospital. You can also call [phone] for an emergency [facility] ambulance, which will take you to the closest [facility].    If you need any assistance for making any appointments for a new consult or referral in any specialty, please call our [facility] Clinical Coordination Center at [phone].

## 2022-09-19 NOTE — PROGRESS NOTE ADULT - PROBLEM SELECTOR PLAN 5
as above
Chronic infarcts. Cleared for surgery per neuro.   Monitor neuro status.   Will discuss when to restart coumadin.
as above
Continue statin.
as above
Chronic infarcts. Cleared for surgery per neuro.   Monitor neuro status.

## 2022-09-19 NOTE — PROGRESS NOTE ADULT - SUBJECTIVE AND OBJECTIVE BOX
Brief summary:  67M PMH HTN, HLD, recent MI, CVA 7/14/22 (left hand weakness, left eye blindness) during which he was found to have LV thrombus (on Coumadin at home), had outpatient EKG showing Q waves with subsequent TTE showing EF 40-45%, then underwent elective cath where he was found to have multivessel disease. On 9/14 he underwent Aortic Valve Replacement (25 mm Inspiris valve) and three vessel CABG (LIMA- LAD, SVG- OM, SVG -ramus) with Dr. Hdz.  Overnight events:  Patient AxO x2 - confused, unaware that he had open heart surgery. Neuro intact.  Patient denies acute pain with radiating or aggravating factors.  He denies chest pain, shortness of breath, palpitations, headache, dizziness, nausea, or vomiting.     PAST MEDICAL & SURGICAL HISTORY:  Hypertension      Hyperlipidemia      CVA (cerebrovascular accident)      Intracardiac thrombus      Abnormal nuclear stress test      Cardiomyopathy      No significant past surgical history          Medications:  acetaminophen     Tablet .. 975 milliGRAM(s) Oral every 6 hours PRN  aMIOdarone    Tablet   Oral   aMIOdarone    Tablet 400 milliGRAM(s) Oral every 8 hours  ascorbic acid 500 milliGRAM(s) Oral daily  aspirin enteric coated 81 milliGRAM(s) Oral daily  clopidogrel Tablet 75 milliGRAM(s) Oral daily  ferrous    sulfate 325 milliGRAM(s) Oral daily  folic acid 1 milliGRAM(s) Oral daily  influenza  Vaccine (HIGH DOSE) 0.7 milliLiter(s) IntraMuscular once  melatonin 5 milliGRAM(s) Oral at bedtime  metoprolol tartrate 50 milliGRAM(s) Oral every 12 hours  pantoprazole    Tablet 40 milliGRAM(s) Oral daily  rosuvastatin 40 milliGRAM(s) Oral at bedtime  sodium chloride 0.9% lock flush 3 milliLiter(s) IV Push every 8 hours  tamsulosin 0.4 milliGRAM(s) Oral at bedtime      MEDICATIONS  (PRN):  acetaminophen     Tablet .. 975 milliGRAM(s) Oral every 6 hours PRN Moderate Pain (4 - 6), Severe Pain (7 - 10)      Daily Review:                                11.3   10.17 )-----------( 138      ( 18 Sep 2022 06:40 )             33.8   09-18    138  |  100  |  18.7  ----------------------------<  112<H>  3.9   |  27.0  |  0.56    Ca    9.2      18 Sep 2022 06:40  Mg     2.0     09-18    TPro  6.5<L>  /  Alb  3.9  /  TBili  0.8  /  DBili  0.2  /  AST  18  /  ALT  22  /  AlkPhos  42  09-18      PT/INR - ( 18 Sep 2022 09:43 )   PT: 14.3 sec;   INR: 1.23 ratio         PTT - ( 18 Sep 2022 09:43 )  PTT:27.5 sec    T(C): 36.7 (09-18-22 @ 22:00), Max: 36.8 (09-18-22 @ 04:57)  HR: 81 (09-18-22 @ 22:00) (81 - 110)  BP: 114/65 (09-18-22 @ 22:00) (108/66 - 126/86)  RR: 18 (09-18-22 @ 22:00) (18 - 18)  SpO2: 95% (09-18-22 @ 22:00) (95% - 97%)  Wt(kg): --    CAPILLARY BLOOD GLUCOSE          I&O's Summary    17 Sep 2022 07:01  -  18 Sep 2022 07:00  --------------------------------------------------------  IN: 340 mL / OUT: 1500 mL / NET: -1160 mL    18 Sep 2022 07:01  -  19 Sep 2022 00:26  --------------------------------------------------------  IN: 650 mL / OUT: 300 mL / NET: 350 mL        Physical Exam  General: Well appearing, NAD  Neuro: AxO x2, non-focal, GARCIA  Cardiac: S1S2, no murmurs  Pulm: CTA b/l, no wheezing or rales  Abdomen: Soft, NT, ND,  Peripheral: +DP pulses b/l, no peripheral edema

## 2022-09-19 NOTE — PROGRESS NOTE ADULT - PROBLEM SELECTOR PROBLEM 6
Severe aortic stenosis
HFrEF (heart failure with reduced ejection fraction)
Severe aortic stenosis
Severe aortic stenosis
Prophylactic measure
Left ventricular thrombus

## 2022-09-19 NOTE — PROGRESS NOTE ADULT - PROBLEM SELECTOR PLAN 1
- Pain control with oxy ir and tylenol prn  - Encourage DBE/IS, wean NC as tolerated   - Daily CXR  - continue lopressor, hold for SBP less than 100 or HR less than 60    - Asa/plavix therapy for graft patency  - Statin therapy for chronic graft occlusion ppx   - Protonix for GI ppx  - Strict i/o's  - Chemical DVT ppx with lovenox, maintain SCD's for mechanical DVT ppx
S/p elective LHC revealing Multivessel CAD 9/12/22.  Preoperative workup underway.    TTE with [facility] to assess LV thrombus, EF, and for valvular dz pending.   Carotid US as above.   Continue heparin gtt for LV thrombus (on coumadin at home).  CT Head as above. Cleared by Neurology for surgery if CT head remains stable.   Continue ASA, statin, and beta-blocker in preop setting.   Plan to be discussed / reviewed with CT Surgery attending / team during AM rounds.
- Pain control with oxy ir and tylenol prn  - Encourage DBE/IS, wean NC as tolerated   - Daily CXR  - continue lopressor, hold for SBP less than 100 or HR less than 60    - Asa therapy for graft patency  - Statin therapy for chronic graft occlusion ppx   - Protonix for GI ppx  - Strict i/o's  - Chemical DVT ppx with lovenox, maintain SCD's for mechanical DVT ppx
s/p Aortic Valve Replacement (25 mm Inspiris valve) and three vessel CABG (LIMA- LAD, SVG- OM, SVG -ramus) with Dr. Hdz on 9/14  Pain control with Tylenol PRN  Encourage DBE/IS, off oxygen   Daily CXR  Continue Lopressor, titrate for goal BP and HR   Asa/plavix therapy for graft patency  Statin therapy for chronic graft occlusion ppx   Strict i/o's
- Pain control with oxy ir and tylenol prn  - Encourage DBE/IS, wean NC as tolerated   - Daily CXR  - consider lopressor, hold for SBP less than 100 or HR less than 60    - Asa therapy for graft patency  - Statin therapy for chronic graft occlusion ppx   - Protonix for GI ppx  - Strict i/o's  - Chemical DVT ppx with lovenox, maintain SCD's for mechanical DVT ppx
- s/p CABG  - Pain control with oxy ir and tylenol prn  - Encourage DBE/IS, off oxygen   - Daily CXR  - continue lopressor, hold for SBP less than 100 or HR less than 60    - Asa/plavix therapy for graft patency  - Statin therapy for chronic graft occlusion ppx   - Protonix for GI ppx  - Strict i/o's  - Chemical DVT ppx with lovenox, maintain SCD's for mechanical DVT ppx

## 2022-09-19 NOTE — PROGRESS NOTE ADULT - PROBLEM SELECTOR PLAN 2
resume lopressor once extubated and off pressor
Continue lopressor. Titrate per HR and BP.
Mostly controlled wih beta blocker monotherapy.  Continue lopressor. Titrate up as HR and BP allows.
resume lopressor once extubated and off pressor
Plan as above.
Continue Lopressor, titrate for goal BP and HR

## 2022-09-19 NOTE — DISCHARGE NOTE PROVIDER - NSDCCPCAREPLAN_GEN_ALL_CORE_FT
PRINCIPAL DISCHARGE DIAGNOSIS  Diagnosis: CAD (coronary artery disease)  Assessment and Plan of Treatment: You had Coronary Artery Bypass Grafting done with Dr. Hdz on . Please continue to take your medications as prescribed. Follow ALL discharge instructions located in discharge booklet.      SECONDARY DISCHARGE DIAGNOSES  Diagnosis: Severe aortic stenosis  Assessment and Plan of Treatment: You had an Aortic Valve Replacement done with Dr. Hdz on . Please continue to take your medications as prescribed. Follow ALL discharge instructions located in discharge booklet.    Diagnosis: Hypertension  Assessment and Plan of Treatment: You have hypertension, or high blood pressure. It is very important to continue your medication as ordered. High blood pressure increases your risk for heart attack, stroke and kidney disease. It does not usually cause symptoms but it can be serious.   1. Be sure to take all of your medication as prescribed.  2. You may find it helpful to get a home blood pressure meter and check your blood pressure periodically.  3. Lifestyle changes can improve your blood pressure and lessen the amount of medication you need, such as: losing weight, choosing a diet low in fat and rich in fruits, vegetables and low-fat dairy products, reducing the amount of salt you eat, cut down on alcohol (to less than two drinks per day) and do something active most days for 30 minutes or more.      Diagnosis: Hyperlipidemia  Assessment and Plan of Treatment: Make your calories count by choosin. Healthy carbohydrates such as fruits, vegetables, whole grains, legumes (beans, peas and lentils) and low-fat dairy products.  2. Fiber-rich foods such as vegetables, fruits, nuts, legumes, whole-wheat flour and wheat bran.  3. Heart-healthy fish twice a week such as cod, tuna and halibut (low-fat options) as well as salmon, mackerel, tuna and bluefish, which are rich in omega-3 fatty acids. Avoid fish with high levels of mercury.  4. "Good" fats such as avocados, almonds, pecans, walnuts, olives and canola, olive and peanut oils.   5. Minimize foods with high saturated fats (beef, hot dogs, sausage and sarmiento), trans fats (processed snacks, baked goods, shortening and stick margarines), high cholesterol foods (high fat dairy products and animal proteins, fried food) and high sodium foods (fast food, many frozen foods, processed food).

## 2022-09-19 NOTE — DISCHARGE NOTE NURSING/CASE MANAGEMENT/SOCIAL WORK - NSDCPEFALRISK_GEN_ALL_CORE
For information on Fall & Injury Prevention, visit: [email or web address] OR  [email or web address] OR  https://www.cdc.gov/steadi/patient.html

## 2022-09-19 NOTE — PROGRESS NOTE ADULT - PROBLEM SELECTOR PROBLEM 4
Unspecified atrial fibrillation
Unspecified atrial fibrillation
Hypertension
CVA (cerebrovascular accident)

## 2022-09-19 NOTE — DISCHARGE NOTE PROVIDER - CARE PROVIDERS DIRECT ADDRESSES
[email or web address],DirectAddress_Unknown

## 2022-09-19 NOTE — PROGRESS NOTE ADULT - ASSESSMENT
67M, pmhx HTN, HLD, recent MI, CVA 7/14/22 (residual left hand weakness, left eye blindness), found to have LV thrombus, now s/p elective cath with Multivessel CAD (dLMCA 80%, pLAD 90%, mLAD 95%, pLCx 80%, mRCA 40%). Patient now undergoing workup for CABG surgery. 
67y Male with stroke affecting left side a few months ago.   Now status post CABG.    Stroke.   Onset of symptoms was mid July of 2022.  Now with mild left field deficit and mild left sided weakness.   On dual antiplatelet agent and statin.  CT as above.     CAD.  Post op care per CT-ICU.     No further specific neurologic recommendations. Will be available as needed.     Case discussed with Dr Walters.
67y Male with stroke affecting left side a few months ago.   Now status post cardiac catheterization.     Stroke.   Onset of symptoms was mid July of 2022.  Now with mild left field deficit and mild left sided weakness.   On anticoagulation for thrombus.   CT as above.     CAD.  Management per cardiology and CT-Surgery.   Cleared for OR from neurologic standpoint.     Case discussed with CT-Surgery team (JENNIFER Rodriguez/Dr Hdz).   
66 yo M PMH HTN, HLD, recent MI, CVA 7/14/22 ( left hand weakness , left eye blindness)  found to have LV thrombus (on Coumadin at home), Carotid: WNL prealb 22TSH 2.1 A1C 6.1  9/14 LIMA- LAD, SVG- OM, SVG -ramus, 25 mm Inspiris valve    
68 yo M PMH HTN, HLD, recent MI, CVA 7/14/22 ( left hand weakness , left eye blindness)  found to have LV thrombus (on Coumadin at home), Carotid: WNL prealb 22TSH 2.1 A1C 6.1  9/14 LIMA- LAD, SVG- OM, SVG -ramus, 25 mm Inspiris valve  9/16 plan for transfer to floor in AM  
67M PMH HTN, HLD, recent MI, CVA 7/14/22 (left hand weakness, left eye blindness) during which he was found to have LV thrombus (on Coumadin at home), had outpatient EKG showing Q waves with subsequent TTE showing EF 40-45%, then underwent elective cath where he was found to have multivessel disease. On 9/14 he underwent Aortic Valve Replacement (25 mm Inspiris valve) and three vessel CABG (LIMA- LAD, SVG- OM, SVG -ramus) with Dr. Hdz. Postop course significant for AF with RVR.   
66 yo M PMH HTN, HLD, recent MI, CVA 7/14/22 ( left hand weakness , left eye blindness)  found to have LV thrombus (on Coumadin at home), Carotid: WNL prealb 22TSH 2.1 A1C 6.1  9/14 LIMA- LAD, SVG- OM, SVG -ramus, 25 mm Inspiris valve  
66 yo M PMH HTN, HLD, recent MI, CVA 7/14/22 ( left hand weakness , left eye blindness)  found to have LV thrombus (on Coumadin at home), Carotid: WNL prealb 22TSH 2.1 A1C 6.1  9/14 LIMA- LAD, SVG- OM, SVG -ramus, 25 mm Inspiris valve

## 2022-09-19 NOTE — DISCHARGE NOTE NURSING/CASE MANAGEMENT/SOCIAL WORK - PATIENT PORTAL LINK FT
You can access the FollowMyHealth Patient Portal offered by [facility] by registering at the following website: [email or web address]. By joining [employer]’s FollowMyHealth portal, you will also be able to view your health information using other applications (apps) compatible with our system.

## 2022-09-19 NOTE — PROGRESS NOTE ADULT - PROVIDER SPECIALTY LIST ADULT
Anesthesia
CT Surgery
Critical Care
Critical Care
Neurology
Intervent Cardiology
Neurology
CT Surgery

## 2022-09-19 NOTE — PROGRESS NOTE ADULT - PROBLEM SELECTOR PLAN 6
Was on Coumadin as outpatient for LV thrombus  LV thrombus was not seen on intraop SIMA  Coumadin restarted 9/18 for AF
GI/DVT ppx
s/p AVR
s/p AVR
Follow up TTE.   EF previously 40-45%, remains on Entresto.   Continue Toprol as tolerated by HR and BP.   Continue aldactone.
s/p AVR

## 2022-09-19 NOTE — DISCHARGE NOTE PROVIDER - HOSPITAL COURSE
67M PMH HTN, HLD, recent MI, CVA 7/14/22 (left hand weakness, left eye blindness) during which he was found to have LV thrombus (on Coumadin at home), had outpatient EKG showing Q waves with subsequent TTE showing EF 40-45%, then underwent elective cath where he was found to have multivessel disease. On 9/14 he underwent Aortic Valve Replacement (25 mm Inspiris valve) and three vessel CABG (LIMA- LAD, SVG- OM, SVG -ramus) with Dr. Hdz. Postop course significant for AF with RVR (now NSR on amiodarone). Patient discharged on Coumadin, INR to be managed by PCP (D/W Dr. Fitzgerald 9/19) Patient to have INR drawn this Thursday 9/22. Discussed with Dr. Hdz and patient cleared for discharge. No anticoagulation TTE preformed per Dr. Hdz.

## 2022-09-19 NOTE — DISCHARGE NOTE NURSING/CASE MANAGEMENT/SOCIAL WORK - NSDCFUADDAPPT_GEN_ALL_CORE_FT
1. Follow up with Dr. Hdz on 10/5/22 @ 12:00 PM in the cardiac surgery office.   The cardiac surgery office is located on the 1st floor [address], [place].     2. Follow up with Dr. Valentine (cardiology) in 2-4 weeks.  3. Follow up with Dr. Fitzgerald (primary care doctor) 9/22 @ 12:15.

## 2022-09-19 NOTE — DISCHARGE NOTE PROVIDER - NSDCFUADDAPPT_GEN_ALL_CORE_FT
1. Follow up with Dr. Hdz on 10/5/22 @ 12:00 PM in the cardiac surgery office.   The cardiac surgery office is located on the 1st floor [address], [place].     2. Follow up with Dr. Valentine (cardiology) in 2-4 weeks.  3. Follow up with Dr. Fitzgerald (primary care doctor) 9/22 @ 12:15.

## 2022-09-19 NOTE — PROGRESS NOTE ADULT - PROBLEM SELECTOR PROBLEM 3
Hyperlipidemia
CVA (cerebrovascular accident)
Hyperlipidemia
Hyperlipidemia

## 2022-09-19 NOTE — DISCHARGE NOTE PROVIDER - NSDCMRMEDTOKEN_GEN_ALL_CORE_FT
acetaminophen 325 mg oral tablet: 2 tab(s) orally every 6 hours, As Needed - 6), Severe Pain (7 - 10)  amiodarone 200 mg oral tablet: 1 tab(s) orally once a day   ascorbic acid 500 mg oral tablet: 1 tab(s) orally once a day  aspirin 81 mg oral tablet, chewable: 1 tab(s) orally once a day  dme: rolling walker  ferrous sulfate 325 mg (65 mg elemental iron) oral tablet: 1 tab(s) orally once a day  folic acid 1 mg oral tablet: 1 tab(s) orally once a day  metoprolol tartrate 50 mg oral tablet: 1 tab(s) orally every 12 hours  rosuvastatin 40 mg oral tablet: 1 tab(s) orally once a day (at bedtime)  tamsulosin 0.4 mg oral capsule: 1 cap(s) orally once a day (at bedtime)  warfarin 7.5 mg oral tablet: 1 tab(s) orally once a day (at bedtime)

## 2022-09-19 NOTE — PROGRESS NOTE ADULT - PROBLEM SELECTOR PLAN 4
will discuss need for coumadin
new onset, post-operative a-fib  currently rate controlled with beta blocker  Amiodarone added for rhythm control  Will discuss with CT Surgery attending timing of systemic anticoagulation for primary stroke prevention given history of LV thrombus, CVA, and now a-fib.
will discuss need for coumadin
Chronic infarcts. Cleared for surgery per neuro.   Monitor neuro status.   Will discuss when to restart coumadin.
Plan as above.   Continue beta-blocker as tolerated by HR and BP.
new onset, post-operative a-fib  converted to NSR 9/18  Continue Amiodarone for rhythm control  Continue Lopressor for rate control  Coumadin started 9/18 for A/C - discuss possibly converting to NOAC prior to discharge (since LV thrombus was not seen on intraop SIMA)

## 2022-09-19 NOTE — PROGRESS NOTE ADULT - PROBLEM SELECTOR PLAN 7
Continue GI ppx with Protonix and Senna  DVT ppx with Coumadin and SCD boots  Strict glucose management for SWI ppx
Hep gtt and SCDs for DVT prophylaxis.  Protonix for GI prophylaxis.
s/p AVRt

## 2022-09-19 NOTE — PROGRESS NOTE ADULT - PROBLEM SELECTOR PROBLEM 5
Prophylactic measure
Prophylactic measure
Hyperlipidemia
CVA (cerebrovascular accident)
Prophylactic measure
CVA (cerebrovascular accident)

## 2022-09-19 NOTE — DISCHARGE NOTE PROVIDER - PROVIDER TOKENS
PROVIDER:[TOKEN:[[id number]],SCHEDULEDAPPT:[10/05/2022]],PROVIDER:[TOKEN:[[id number]]],PROVIDER:[TOKEN:[[id number]],SCHEDULEDAPPT:[09/22/2022],SCHEDULEDAPPTTIME:[12:15 PM]]

## 2022-09-19 NOTE — PROGRESS NOTE ADULT - PROBLEM SELECTOR PLAN 3
Patient is a 71y old  Male who presents with a chief complaint of acute cholecystitis (18 Jan 2018 14:49)      HPI:  70 yo M with h/o severe mitral and aortic valve regurgitation presented to ED with 5 day h/o left lower abdominal pain, nausea and multiple episodes of vomiting as well as PO intolerance. Pt reports that the pain in his abdomen is illicited by movement. Went to [facility] two days ago and underwent CT scan which revealed acute cholecystitis as well as right lower lobe infectious pneumonia. Pt was told he would require surgery and left AMA because he was waiting too long. Denies f/c. Last echo 1/4/17 with EF 60-65%. Normal BMs and passing flatus. (18 Jan 2018 14:49)    INTERVAL HPI/OVERNIGHT EVENTS:::comfortable    HEALTH ISSUES - PROBLEM Dx:  Candidiasis, unspecified: Candidiasis, unspecified  Acute congestive heart failure, unspecified congestive heart failure type: Acute congestive heart failure, unspecified congestive heart failure type  Fever, unspecified fever cause: Fever, unspecified fever cause  Fever and chills: Fever and chills  Mitral valve insufficiency, unspecified etiology: Mitral valve insufficiency, unspecified etiology  Anasarca: Anasarca  Sepsis due to Candida: Sepsis due to Candida  Candidiasis, disseminated: Candidiasis, disseminated  Metabolic acidosis: Metabolic acidosis  Acute kidney injury: Acute kidney injury  Sepsis, due to unspecified organism: Sepsis, due to unspecified organism  Atrial fibrillation, unspecified type: Atrial fibrillation, unspecified type  Coagulopathy: Coagulopathy  Mitral regurgitation: Mitral regurgitation  Chronic kidney insufficiency, stage 3 (moderate): Chronic kidney insufficiency, stage 3 (moderate)  Thrombocytopenia: Thrombocytopenia  Transaminitis: Transaminitis  Acute CHF: Acute CHF  Afib: Afib  Sepsis: Sepsis  Abdominal pain, unspecified abdominal location: Abdominal pain, unspecified abdominal location  Aortic valve insufficiency, etiology of cardiac valve disease unspecified: Aortic valve insufficiency, etiology of cardiac valve disease unspecified  Hepatitis: Hepatitis  Pneumonia, bacterial: Pneumonia, bacterial          PAST MEDICAL & SURGICAL HISTORY:  Mitral regurgitation  Aortic regurgitation  No significant past surgical history          Consultant NOTE  REVIEWED  (   )    REVIEW OF SYSTEMS:  [x] As per HPI  CONSTITUTIONAL: No fever, weight loss, or fatigue  RESPIRATORY: trach  CARDIOVASCULAR: No chest pain, palpitations, dizziness, or leg swelling  GASTROINTESTINAL: see HPI  MUSCULOSKELETAL: weakness  PSYCH    awake, alert       [x] All others negative	  [ ] Unable to obtain          Vital Signs Last 24 Hrs  T(C): 36.8 (24 Feb 2018 09:00), Max: 37.4 (23 Feb 2018 21:34)  T(F): 98.2 (24 Feb 2018 09:00), Max: 99.3 (23 Feb 2018 21:34)  HR: 102 (24 Feb 2018 13:30) (92 - 106)  BP: 119/78 (24 Feb 2018 13:30) (106/59 - 132/78)  BP(mean): 94 (24 Feb 2018 13:30) (80 - 97)  RR: 16 (24 Feb 2018 13:30) (16 - 18)  SpO2: 100% (24 Feb 2018 13:30) (97% - 100%)        02-23 @ 07:01  -  02-24 @ 07:00  --------------------------------------------------------  IN: 816 mL / OUT: 950 mL / NET: -134 mL    02-24 @ 07:01  -  02-24 @ 17:24  --------------------------------------------------------  IN: 612 mL / OUT: 350 mL / NET: 262 mL      PHYSICAL EXAMINATION:                                    (    )  NO CHANGE  Appearance: Normal	  HEENT:   Normal oral mucosa, PERRL, EOMI	  Neck: Supple trach  Cardiovascular: Normal S1 S2,   Respiratory: Lungs clear to auscultation/Decreased Breath Sounds/No Rales, Rhonchi, Wheezing	  Gastrointestinal:  scar/ feeding tube  Skin: see RN note  Extremities: decr ROM  Vascular: Peripheral pulses decreased  Neurologic: Non-focal  Psychiatry: A & O x 3, Mood & affect appropriate    acetaminophen    Suspension 650 milliGRAM(s) Enteral Tube every 6 hours PRN  ALBUTerol/ipratropium for Nebulization 3 milliLiter(s) Nebulizer every 6 hours  apixaban 5 milliGRAM(s) Oral every 12 hours  BACItracin   Ointment 1 Application(s) Topical daily  bisacodyl Suppository 10 milliGRAM(s) Rectal daily PRN  chlorhexidine 0.12% Liquid 15 milliLiter(s) Swish and Spit two times a day  digoxin     Tablet 0.125 milliGRAM(s) Oral daily  docusate sodium Liquid 100 milliGRAM(s) Oral two times a day  insulin lispro (HumaLOG) corrective regimen sliding scale   SubCutaneous every 6 hours  lidocaine 2% Injectable 50 milliLiter(s) Local Injection once  mineral oil for Topical Use 1 Application(s) Topical daily  pantoprazole  Injectable 40 milliGRAM(s) IV Push every 12 hours  povidone iodine 10% Solution 1 Application(s) Topical daily  sodium chloride 0.9% lock flush 10 milliLiter(s) IV Push every 1 hour PRN  sodium chloride 0.9% lock flush 20 milliLiter(s) IV Push once  tamsulosin 0.4 milliGRAM(s) Oral at bedtime                                      8.0    8.2   )-----------( 154      ( 24 Feb 2018 06:49 )             26.5     02-24    145  |  108  |  35<H>  ----------------------------<  141<H>  3.9   |  29  |  0.58    Ca    7.8<L>      24 Feb 2018 06:42  Phos  2.9     02-24  Mg     2.4     02-24    TPro  5.9<L>  /  Alb  2.5<L>  /  TBili  1.7<H>  /  DBili  x   /  AST  51<H>  /  ALT  56<H>  /  AlkPhos  185<H>  02-24      CAPILLARY BLOOD GLUCOSE      POCT Blood Glucose.: 145 mg/dL (24 Feb 2018 17:07)  POCT Blood Glucose.: 143 mg/dL (24 Feb 2018 11:12)  POCT Blood Glucose.: 139 mg/dL (24 Feb 2018 06:54)  POCT Blood Glucose.: 155 mg/dL (24 Feb 2018 01:40)  POCT Blood Glucose.: 159 mg/dL (24 Feb 2018 00:21)
resume statin post cabg
Continue statin.
Continue statin.
resume statin post cabg
Plan as above.
Continue statin.

## 2022-09-19 NOTE — DISCHARGE NOTE PROVIDER - CARE PROVIDER_API CALL
Josue Hdz (MD)  Surgery; Thoracic and Cardiac Surgery  [address] [place] [address]  Phone: [phone]  Fax: [phone]  Scheduled Appointment: 10/05/2022    Wendy Valentine)  Adv Heart Fail Trnsplnt Cardio; Cardiovascular Disease  [address] [place] [address]  Phone: [phone]  Fax: [phone]  Follow Up Time:     EUGENE SAUCEDA  Advanced Practice Midwife  [address] [facility], [facility] [place] [address]  Phone: [phone]  Fax: ([phone]  Scheduled Appointment: 09/22/2022 12:15 PM

## 2022-09-20 ENCOUNTER — NON-APPOINTMENT (OUTPATIENT)
Age: 67
End: 2022-09-20

## 2022-09-20 PROBLEM — I42.9 CARDIOMYOPATHY, UNSPECIFIED: Chronic | Status: ACTIVE | Noted: 2022-09-09

## 2022-09-20 PROBLEM — I51.3 INTRACARDIAC THROMBOSIS, NOT ELSEWHERE CLASSIFIED: Chronic | Status: ACTIVE | Noted: 2022-09-09

## 2022-09-20 PROBLEM — E78.5 HYPERLIPIDEMIA, UNSPECIFIED: Chronic | Status: ACTIVE | Noted: 2022-09-09

## 2022-09-20 PROBLEM — I10 ESSENTIAL (PRIMARY) HYPERTENSION: Chronic | Status: ACTIVE | Noted: 2022-09-09

## 2022-09-20 PROBLEM — R94.39 ABNORMAL RESULT OF OTHER CARDIOVASCULAR FUNCTION STUDY: Chronic | Status: ACTIVE | Noted: 2022-09-09

## 2022-09-20 PROBLEM — I63.9 CEREBRAL INFARCTION, UNSPECIFIED: Chronic | Status: ACTIVE | Noted: 2022-09-09

## 2022-09-20 RX ORDER — MULTIVIT-MIN/FOLIC/VIT K/LYCOP 400-300MCG
500 TABLET ORAL
Refills: 0 | Status: ACTIVE | COMMUNITY

## 2022-09-20 RX ORDER — FOLIC ACID 1 MG/1
1 TABLET ORAL
Refills: 0 | Status: ACTIVE | COMMUNITY

## 2022-09-20 RX ORDER — WARFARIN SODIUM 7.5 MG/1
7.5 TABLET ORAL
Refills: 0 | Status: ACTIVE | COMMUNITY

## 2022-09-20 RX ORDER — AMIODARONE HYDROCHLORIDE 200 MG/1
200 TABLET ORAL
Refills: 0 | Status: ACTIVE | COMMUNITY

## 2022-09-20 RX ORDER — CHLORHEXIDINE GLUCONATE 4 %
325 (65 FE) LIQUID (ML) TOPICAL
Refills: 0 | Status: ACTIVE | COMMUNITY

## 2022-09-20 RX ORDER — TAMSULOSIN HCL 0.4 MG
0.4 CAPSULE ORAL
Refills: 0 | Status: ACTIVE | COMMUNITY

## 2022-09-20 RX ORDER — ASPIRIN ENTERIC COATED TABLETS 81 MG 81 MG/1
81 TABLET, DELAYED RELEASE ORAL
Refills: 0 | Status: ACTIVE | COMMUNITY

## 2022-09-20 RX ORDER — METOPROLOL TARTRATE 50 MG/1
50 TABLET, FILM COATED ORAL
Qty: 180 | Refills: 3 | Status: ACTIVE | COMMUNITY

## 2022-09-21 ENCOUNTER — APPOINTMENT (OUTPATIENT)
Dept: CARE COORDINATION | Facility: HOME HEALTH | Age: 67
End: 2022-09-21

## 2022-09-21 VITALS
WEIGHT: 194 LBS | HEART RATE: 99 BPM | RESPIRATION RATE: 16 BRPM | SYSTOLIC BLOOD PRESSURE: 122 MMHG | OXYGEN SATURATION: 98 % | BODY MASS INDEX: 26.28 KG/M2 | DIASTOLIC BLOOD PRESSURE: 68 MMHG | HEIGHT: 72 IN

## 2022-09-21 PROCEDURE — 99024 POSTOP FOLLOW-UP VISIT: CPT

## 2022-09-21 NOTE — HISTORY OF PRESENT ILLNESS
[FreeTextEntry1] : 67M PMH HTN, HLD, recent MI, CVA 7/14/22 (left hand weakness, left eye\par blindness) during which he was found to have LV thrombus (on Coumadin at home),\par had outpatient EKG showing Q waves with subsequent TTE showing EF 40-45%, then\par underwent elective cath where he was found to have multivessel disease. On 9/14\par he underwent Aortic Valve Replacement (25 mm Inspiris valve) and three vessel\par CABG (LIMA- LAD, SVG- OM, SVG -ramus) with Dr. Hdz. Postop course\par significant for AF with RVR (now NSR on amiodarone). Patient discharged on\par Coumadin, INR to be managed by PCP (D/W Dr. Fitzgerald 9/19) Patient to have INR\par drawn this Thursday 9/22. Pt remained hemodynamically stable and discharged home with support of spouse, home care services and the [facility] team. Initial visit completed in home/hotel\par CC "I've been better"\par

## 2022-09-21 NOTE — ASSESSMENT
[FreeTextEntry1] : Pt recovering well at home s/p OHS. Reviewed all medications and dosages with pt understanding. Pt has all medications in home and is taking as prescribed. Pain controlled with current medication regimen. No new symptoms, issues or concerns to report at this time.\par Pt currently living in hotel as he had to leave his rental. His plan is to move to [place] end of October with his spouse.

## 2022-09-21 NOTE — REVIEW OF SYSTEMS
[Eyesight Problems] : eyesight problems [Suicidal] : not suicidal [Sleep Disturbances] : no sleep disturbances [Anxiety] : anxiety [Depression] : no depression [Negative] : Heme/Lymph [FreeTextEntry3] : left eye at baseline [FreeTextEntry6] : some sputum production with occasional cough [FreeTextEntry7] : no diarrhea today, improved bowel status from yesterday

## 2022-09-21 NOTE — PHYSICAL EXAM
[Sclera] : the sclera and conjunctiva were normal [Neck Appearance] : the appearance of the neck was normal [Respiration, Rhythm And Depth] : normal respiratory rhythm and effort [Exaggerated Use Of Accessory Muscles For Inspiration] : no accessory muscle use [Auscultation Breath Sounds / Voice Sounds] : lungs were clear to auscultation bilaterally [Apical Impulse] : the apical impulse was normal [Heart Sounds] : normal S1 and S2 [Murmurs] : no murmurs [1+] : left 1+ [FreeTextEntry2] : B/L LE without edema, B/L calves soft, NT [Bowel Sounds] : normal bowel sounds [Abdomen Soft] : soft [Abdomen Tenderness] : non-tender [Abnormal Walk] : normal gait [Musculoskeletal - Swelling] : no joint swelling seen [Skin Color & Pigmentation] : normal skin color and pigmentation [Skin Turgor] : normal skin turgor [] : no rash [FreeTextEntry1] : SVG harvest site without erythema, warmth or drainage. Resolving soft, NT ecchymosis to thigh area. RIght AC with resolving ecchymosis [Oriented To Time, Place, And Person] : oriented to person, place, and time [Impaired Insight] : insight and judgment were intact [Affect] : the affect was normal

## 2022-09-21 NOTE — REASON FOR VISIT
[Follow-Up: _____] : a [unfilled] follow-up visit [Spouse] : spouse [FreeTextEntry1] : FOLLOW YOUR HEART- Transitional Care Management Program- [facility]\par \par

## 2022-09-23 ENCOUNTER — TRANSCRIPTION ENCOUNTER (OUTPATIENT)
Age: 67
End: 2022-09-23

## 2022-09-25 ENCOUNTER — TRANSCRIPTION ENCOUNTER (OUTPATIENT)
Age: 67
End: 2022-09-25

## 2022-09-26 ENCOUNTER — NON-APPOINTMENT (OUTPATIENT)
Age: 67
End: 2022-09-26

## 2022-09-26 ENCOUNTER — TRANSCRIPTION ENCOUNTER (OUTPATIENT)
Age: 67
End: 2022-09-26

## 2022-09-29 ENCOUNTER — TRANSCRIPTION ENCOUNTER (OUTPATIENT)
Age: 67
End: 2022-09-29

## 2022-09-29 NOTE — CDI QUERY NOTE - NSCDIOTHERTXTBX_GEN_ALL_CORE_HH
9/12/22 Cardiac Catheterization    Procedures Performed   Procedures:               1.    Arterial Access - Right Radial     2.    Diagnostic Coronary Angiography     Indications:               Congestive heart failure, acute systolic     Diagnostic Conclusions:     Severe LM and multivessel disease.    No LV due to previous thrombus.    Recommendations:     CABG and evaluation of AV.      Acute complication:    No complications         Consult Note Adult-CT Surgery Physician Assistant [[address]] [Authored: 12-Sep-2022 12:34]  Problem/Recommendation - 6:  •? Problem: HFrEF (heart failure with reduced ejection fraction).   •? Recommendation: EF 40-45 on prior outpt echo per cardiology  repeat TTE pending  cont Entresto for now, will need to hold when plan/timing for cabg determined.          Acute condition for HF was documented only in the cath report, after study can you please clarify the CHF acuity?  A.	Acute systolic heart failure  B.	Chronic systolic heart failure  C.	Acute on chronic systolic heart failure  D.	Other, please specify  E.	Not clinically significant

## 2022-10-05 ENCOUNTER — APPOINTMENT (OUTPATIENT)
Dept: CARDIOTHORACIC SURGERY | Facility: CLINIC | Age: 67
End: 2022-10-05

## 2022-10-05 VITALS
SYSTOLIC BLOOD PRESSURE: 127 MMHG | RESPIRATION RATE: 16 BRPM | DIASTOLIC BLOOD PRESSURE: 66 MMHG | HEIGHT: 72 IN | OXYGEN SATURATION: 97 % | HEART RATE: 63 BPM

## 2022-10-05 PROCEDURE — 99024 POSTOP FOLLOW-UP VISIT: CPT

## 2022-10-06 ENCOUNTER — TRANSCRIPTION ENCOUNTER (OUTPATIENT)
Age: 67
End: 2022-10-06

## 2022-10-07 ENCOUNTER — NON-APPOINTMENT (OUTPATIENT)
Age: 67
End: 2022-10-07

## 2022-10-17 NOTE — DIETITIAN INITIAL EVALUATION ADULT - WEIGHT FOR BMI (KG)
90 Rhofade Counseling: Rhofade is a topical medication which can decrease superficial blood flow where applied. Side effects are uncommon and include stinging, redness and allergic reactions.

## 2022-12-29 PROBLEM — Z95.3 S/P AORTIC VALVE REPLACEMENT WITH BIOPROSTHETIC VALVE: Status: ACTIVE | Noted: 2022-10-05

## 2022-12-29 PROBLEM — Z95.1 S/P CABG X 3: Status: ACTIVE | Noted: 2022-10-05

## 2022-12-29 NOTE — HISTORY OF PRESENT ILLNESS
[FreeTextEntry1] : Mr. BONDS is a 67 year old male referred by Dr. Valentine who presents for follow up care. 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 \par \par He is status post Aortic Valve Replacement # 25 mm Inspirus CABG (LIMA- LAD, SVG- OM, SVG -ramus) on 09/14/22. He reports for followup care.

## 2023-01-04 ENCOUNTER — APPOINTMENT (OUTPATIENT)
Dept: CARDIOTHORACIC SURGERY | Facility: CLINIC | Age: 68
End: 2023-01-04

## 2023-01-04 DIAGNOSIS — Z95.1 PRESENCE OF AORTOCORONARY BYPASS GRAFT: ICD-10-CM

## 2023-01-04 DIAGNOSIS — Z95.3 PRESENCE OF XENOGENIC HEART VALVE: ICD-10-CM

## 2023-05-30 NOTE — ASU PATIENT PROFILE, ADULT - FALL HARM RISK - FALL HARM RISK
No indicators present Spironolactone Counseling: Patient advised regarding risks of diarrhea, abdominal pain, hyperkalemia, birth defects (for female patients), liver toxicity and renal toxicity. The patient may need blood work to monitor liver and kidney function and potassium levels while on therapy. The patient verbalized understanding of the proper use and possible adverse effects of spironolactone.  All of the patient's questions and concerns were addressed.

## (undated) DEVICE — NDL PERC BASEPLT 18GX7CM

## (undated) DEVICE — SUT SILK 2-0 8-18" SH

## (undated) DEVICE — DRSG TEGADERM 4X4.75"

## (undated) DEVICE — SET BLOOD Y-TYPE

## (undated) DEVICE — SOL INJ NS 0.9% 500ML 1-PORT

## (undated) DEVICE — GLV 8.5 PROTEXIS

## (undated) DEVICE — GOWN XL W TOWEL

## (undated) DEVICE — Device

## (undated) DEVICE — SUT PROLENE 5-0 30" RB-2

## (undated) DEVICE — SUT SILK 5-0 60" TIES

## (undated) DEVICE — TUBING INSUFFLATION LAP FILTER 10FT

## (undated) DEVICE — TRAY IRRIGATION SYR BULB 60CC

## (undated) DEVICE — SUT MONOCRYL 4-0 27" PS-2 UNDYED

## (undated) DEVICE — SUT PROLENE 6-0 24" C-1

## (undated) DEVICE — SUT ETHIBOND 4-0 36" RB-1

## (undated) DEVICE — SUT PROLENE 7-0 24" BV175-6

## (undated) DEVICE — SUT SILK 4-0 30" RB-1

## (undated) DEVICE — SUT BOOT ASSORTED 5 PAIR PER CARTRIDGE

## (undated) DEVICE — TUBING IV SET SECOND 34" W/O LOK-BLUNT

## (undated) DEVICE — NDL TAPR FR EYE 1/2 CIR 3

## (undated) DEVICE — SPONGE PEANUT AUTO COUNT

## (undated) DEVICE — SUT ETHIBOND 2-0 30" SH-1 GREEN/WHITE

## (undated) DEVICE — SUCTION YANKAUER NO CONTROL VENT

## (undated) DEVICE — SUT VICRYL 2 27" TP-1 UNDYED

## (undated) DEVICE — BLANKET HYPER/HYPO ADULT CT/10

## (undated) DEVICE — ELCTR MULTIFUNCTION DEFIBRILLATION ELECTRODE EDGE SYSTEM ADULT

## (undated) DEVICE — DRAPE TOWEL WHITE 17" X 24"

## (undated) DEVICE — POSITIONER CARDIAC BUMP

## (undated) DEVICE — SUT PROLENE 3-0 36" MH

## (undated) DEVICE — TUBING SUCTION 20FT

## (undated) DEVICE — SUT PROLENE 7-0 30" BV-1

## (undated) DEVICE — SUT ETHIBOND 2-0 4-30" RB-1 WHITE

## (undated) DEVICE — SUT PROLENE 4-0 36" SH

## (undated) DEVICE — NEEDLE COUNTER  FOAM AND MAGNET 40-70

## (undated) DEVICE — PUN AOR STD 5MM

## (undated) DEVICE — SUT PROLENE 8-0 24" BV175-6

## (undated) DEVICE — SUT VICRYL 0 36" CTX UNDYED

## (undated) DEVICE — DRSG OPSITE POSTOP 2.5"X2"

## (undated) DEVICE — GLV 6.5 ESTEEM BLUE

## (undated) DEVICE — GLV 7.5 SENSICARE W ALOE

## (undated) DEVICE — PREP CHLORAPREP ORANGE 2PCT 26ML

## (undated) DEVICE — TUBING MEDI-VAC W MAXIGRIP CONNECTORS 1/4"X6'

## (undated) DEVICE — SUT VICRYL 2-0 27" CT-1 UNDYED

## (undated) DEVICE — SUT VICRYL 3-0 27" CT-1

## (undated) DEVICE — GLV 7 ESTEEM BLUE

## (undated) DEVICE — CLAMP BULLDOG MIDI STRAIGHT WHITE DISP

## (undated) DEVICE — CLIP SOFTJAW DBL 6MM

## (undated) DEVICE — CLAMP SOFT FIBRA INSERT

## (undated) DEVICE — FRAZIER SUCTION TIP 10FR

## (undated) DEVICE — FOLEY TRAY 16FR 5CC LF LUBRISIL ADVANCE TEMP CLOSED

## (undated) DEVICE — VESSEL LOOP ASPEN MAXI RED

## (undated) DEVICE — PLEDGET POLYMER 9.5X4.8MM

## (undated) DEVICE — BLADE SURGICAL #11 CARBON

## (undated) DEVICE — DRAPE SLUSH MACHINE 44X66"

## (undated) DEVICE — GLV 6 PROTEXIS

## (undated) DEVICE — SWITCH PERF ARISS TABLE

## (undated) DEVICE — PRESSURE INFUSOR BAG 1000ML

## (undated) DEVICE — BLADE SURGICAL #15 CARBON

## (undated) DEVICE — CABLE PACE BI-V TEMP ALLIGA  DISP 12FT

## (undated) DEVICE — SUT PERMAHAND SILK 2 60" TIES

## (undated) DEVICE — SOL INJ NS 0.9% 1000ML

## (undated) DEVICE — ASSISTANT ATTACHMENT W STABLESOFT 2S

## (undated) DEVICE — TAPE UMBILICAL 1/8 X 30" STRANDS

## (undated) DEVICE — PRESSURE INFUSER BAG 500ML DISP

## (undated) DEVICE — GLV 6.5 PROTEXIS

## (undated) DEVICE — DRAPE TOWEL BLUE 17" X 24"

## (undated) DEVICE — LIGATING CLIPS AESCULAP LARGE 6

## (undated) DEVICE — SUT DOUBLE 6 WIRE STERNAL

## (undated) DEVICE — SUT PROLENE 5-0 36" RB-1

## (undated) DEVICE — SUT ETHIBOND 1 30" OS6

## (undated) DEVICE — BLADE STERNUM 31MM X 6.27 X .79

## (undated) DEVICE — IRRISEPT JET LAVAGE W 0.05 PCT CHG

## (undated) DEVICE — LIGATING CLIPS AESCULAP MEDIUM BLUE 24

## (undated) DEVICE — SUT VICRYL 0 54" REEL UNDYED

## (undated) DEVICE — ADAPTER CARDIOPL VENT Y 19.1CM

## (undated) DEVICE — SUT GORETEX CV-4 (3-0) 36" TH-18

## (undated) DEVICE — ELCTR BOVIE BLADE 3/4" EXTENDED LENGTH 6"

## (undated) DEVICE — DRAPE 3/4 SHEET 52X76"

## (undated) DEVICE — TUBING ALARIS PUMP MODULE NON-DEHP

## (undated) DEVICE — ELCTR BOVIE HANDHELD PENCIL ROCKER SWITCH 15FT

## (undated) DEVICE — BLADE MINI SHARP ALL ROUND

## (undated) DEVICE — PAD PHRENIC NERVE MED

## (undated) DEVICE — GOWN TRIMAX LG

## (undated) DEVICE — STAPLER SKIN PROXIMATE

## (undated) DEVICE — MEDICATION LABELS W MARKER

## (undated) DEVICE — GLV 7.5 PROTEXIS

## (undated) DEVICE — STOPCOCK 3 WAY W SWIVEL MALE LUER LOCK

## (undated) DEVICE — CABLE PACE A/V TEMP SCREW DOWN DISP 6FT

## (undated) DEVICE — SUT ETHIBOND 2-0 36" SH

## (undated) DEVICE — SUT SILK 0 30" TIES

## (undated) DEVICE — DRSG SILICONE FOAM MEPILEX BORDER SACRUM SM

## (undated) DEVICE — DRSG TAPE TRANSPORE 3"

## (undated) DEVICE — VASOVIEW HARVESTING SYS 7 XB

## (undated) DEVICE — DRSG MOLNLYCKE MEPILEX BORDER AG 4X4

## (undated) DEVICE — SUT PROLENE 4-0 36" RB-1

## (undated) DEVICE — SUT PROLENE 7-0 24" BV-1

## (undated) DEVICE — SUT PDS II 2-0 27" CT-1

## (undated) DEVICE — DRSG SAFETAC FOAM MEPILEX 4X12"

## (undated) DEVICE — SPONGE LAP X-RAY DETECTABLE 18X18"

## (undated) DEVICE — SUCTION TIP CARD SFT SAUGER 6FR

## (undated) DEVICE — GLV 8 PROTEXIS

## (undated) DEVICE — BLADE SURGICAL #20 CARBON

## (undated) DEVICE — SUT ETHIBOND 5 4-30" CCS

## (undated) DEVICE — SUT PROLENE 5-0 36" C-1

## (undated) DEVICE — PUN AOR 4MM

## (undated) DEVICE — SUT PROLENE 4-0 30" SH-1

## (undated) DEVICE — SUT PROLENE 6-0 30" C-1

## (undated) DEVICE — DRSG 4X4

## (undated) DEVICE — CATH IV SAFE BC YEL 24GAX0.75"

## (undated) DEVICE — SUT SILK 0 30" KS

## (undated) DEVICE — SYR LUER LOK 50CC

## (undated) DEVICE — MARKER SKIN MULTI TIP 6"

## (undated) DEVICE — GLV 7 PROTEXIS

## (undated) DEVICE — SUT SILK 0 30" SH

## (undated) DEVICE — SYS VEIN HARVESTING VIRTUOSAPH PLUS W/ RADIAL

## (undated) DEVICE — SUT ETHIBOND 2-0 30" V5 WHITE

## (undated) DEVICE — SAW BLADE STRYKER CORE FAN 41MM

## (undated) DEVICE — DRSG OPSITE POSTOP 13.75"X4"

## (undated) DEVICE — RANGER BLOOD/FLUID WARMING SET DISP

## (undated) DEVICE — KIT SUCT MAXIFLO STRT CATH 14FR

## (undated) DEVICE — DRAPE INSTRUMENT POUCH

## (undated) DEVICE — SUT ETHIBOND 3-0 36" RB-1

## (undated) DEVICE — DRAPE IOBAN 23X33"

## (undated) DEVICE — SUT VICRYL 1 36" CTX UNDYED

## (undated) DEVICE — DRAIN DRAINAGE SET CHEST DRY ADL

## (undated) DEVICE — CLEANER FOR ELCTR TIP

## (undated) DEVICE — PERF ST MULT CLR CODE 38CM

## (undated) DEVICE — STOPCOCK 3 WAY W TUBE 35"

## (undated) DEVICE — SPONGE RAYTEC 4X4 16PLY

## (undated) DEVICE — DRAPE CV SPLIT SHEETS 106X135"

## (undated) DEVICE — VASCULAR PROBES

## (undated) DEVICE — TUBING TRUWAVE PRESSURE MALE/FEMALE 72"

## (undated) DEVICE — PACK OPEN HEART VAMP PLUS

## (undated) DEVICE — ASSISTANT ATTACHMENT W STABLESOFT 2L

## (undated) DEVICE — SUT MONOCRYL 3-0 27" PS-2 UNDYED

## (undated) DEVICE — SOL ANTI FOG

## (undated) DEVICE — CONN REDUCR 3/8 X 1/2

## (undated) DEVICE — CONN PERF STRT 0.25X3/8"

## (undated) DEVICE — SYR LUER LOK 20CC

## (undated) DEVICE — SENSOR MYOCARDIAL TEMP 15MM

## (undated) DEVICE — SUT SILK 2 30" MH

## (undated) DEVICE — PREP SCRUB BRUSH W CHG 4%

## (undated) DEVICE — SUT PROLENE 3-0 36" SH